# Patient Record
Sex: MALE | Race: WHITE | NOT HISPANIC OR LATINO | Employment: FULL TIME | URBAN - METROPOLITAN AREA
[De-identification: names, ages, dates, MRNs, and addresses within clinical notes are randomized per-mention and may not be internally consistent; named-entity substitution may affect disease eponyms.]

---

## 2018-02-12 ENCOUNTER — APPOINTMENT (EMERGENCY)
Dept: CT IMAGING | Facility: HOSPITAL | Age: 58
DRG: 811 | End: 2018-02-12
Payer: COMMERCIAL

## 2018-02-12 ENCOUNTER — HOSPITAL ENCOUNTER (INPATIENT)
Facility: HOSPITAL | Age: 58
LOS: 3 days | Discharge: HOME | DRG: 811 | End: 2018-02-15
Attending: EMERGENCY MEDICINE | Admitting: GENERAL PRACTICE
Payer: COMMERCIAL

## 2018-02-12 ENCOUNTER — APPOINTMENT (EMERGENCY)
Dept: RADIOLOGY | Facility: HOSPITAL | Age: 58
DRG: 811 | End: 2018-02-12
Payer: COMMERCIAL

## 2018-02-12 DIAGNOSIS — K62.5 RECTAL BLEEDING: ICD-10-CM

## 2018-02-12 DIAGNOSIS — R19.7 BLOODY DIARRHEA: ICD-10-CM

## 2018-02-12 DIAGNOSIS — I48.91 ATRIAL FIBRILLATION WITH RVR (HCC): ICD-10-CM

## 2018-02-12 DIAGNOSIS — I48.91 RAPID ATRIAL FIBRILLATION (HCC): ICD-10-CM

## 2018-02-12 DIAGNOSIS — D62 ACUTE BLOOD LOSS ANEMIA: Primary | ICD-10-CM

## 2018-02-12 DIAGNOSIS — K92.2 ACUTE LOWER GI BLEEDING: ICD-10-CM

## 2018-02-12 PROBLEM — N17.9 AKI (ACUTE KIDNEY INJURY) (HCC): Status: ACTIVE | Noted: 2018-02-12

## 2018-02-12 PROBLEM — I10 HYPERTENSION: Status: ACTIVE | Noted: 2018-02-12

## 2018-02-12 LAB
ABO GROUP BLD: NORMAL
ALBUMIN SERPL BCP-MCNC: 3.2 G/DL (ref 3.5–5)
ALP SERPL-CCNC: 62 U/L (ref 46–116)
ALT SERPL W P-5'-P-CCNC: 37 U/L (ref 12–78)
ANION GAP SERPL CALCULATED.3IONS-SCNC: 8 MMOL/L (ref 4–13)
APTT PPP: 31 SECONDS (ref 23–35)
AST SERPL W P-5'-P-CCNC: 15 U/L (ref 5–45)
ATRIAL RATE: 93 BPM
BASOPHILS # BLD AUTO: 0.03 THOUSANDS/ΜL (ref 0–0.1)
BASOPHILS NFR BLD AUTO: 0 % (ref 0–1)
BILIRUB SERPL-MCNC: 0.3 MG/DL (ref 0.2–1)
BILIRUB UR QL STRIP: NEGATIVE
BLD GP AB SCN SERPL QL: NEGATIVE
BUN SERPL-MCNC: 36 MG/DL (ref 5–25)
CALCIUM SERPL-MCNC: 8.4 MG/DL (ref 8.3–10.1)
CAMPYLOBACTER DNA SPEC NAA+PROBE: NORMAL
CHLORIDE SERPL-SCNC: 104 MMOL/L (ref 100–108)
CLARITY UR: CLEAR
CO2 SERPL-SCNC: 27 MMOL/L (ref 21–32)
COLOR UR: YELLOW
CREAT SERPL-MCNC: 1.55 MG/DL (ref 0.6–1.3)
EOSINOPHIL # BLD AUTO: 0.04 THOUSAND/ΜL (ref 0–0.61)
EOSINOPHIL NFR BLD AUTO: 0 % (ref 0–6)
ERYTHROCYTE [DISTWIDTH] IN BLOOD BY AUTOMATED COUNT: 13.2 % (ref 11.6–15.1)
GFR SERPL CREATININE-BSD FRML MDRD: 49 ML/MIN/1.73SQ M
GLUCOSE SERPL-MCNC: 125 MG/DL (ref 65–140)
GLUCOSE UR STRIP-MCNC: NEGATIVE MG/DL
HCT VFR BLD AUTO: 27.4 % (ref 36.5–49.3)
HCT VFR BLD AUTO: 27.8 % (ref 36.5–49.3)
HGB BLD-MCNC: 8.9 G/DL (ref 12–17)
HGB BLD-MCNC: 9 G/DL (ref 12–17)
HGB BLD-MCNC: 9.9 G/DL (ref 12–17)
HGB UR QL STRIP.AUTO: NEGATIVE
INR PPP: 2.8 (ref 0.86–1.16)
KETONES UR STRIP-MCNC: ABNORMAL MG/DL
LACTATE SERPL-SCNC: 2.2 MMOL/L (ref 0.5–2)
LACTATE SERPL-SCNC: 2.7 MMOL/L (ref 0.5–2)
LACTATE SERPL-SCNC: 2.8 MMOL/L (ref 0.5–2)
LEUKOCYTE ESTERASE UR QL STRIP: NEGATIVE
LYMPHOCYTES # BLD AUTO: 1.79 THOUSANDS/ΜL (ref 0.6–4.47)
LYMPHOCYTES NFR BLD AUTO: 11 % (ref 14–44)
MCH RBC QN AUTO: 30.4 PG (ref 26.8–34.3)
MCHC RBC AUTO-ENTMCNC: 32.4 G/DL (ref 31.4–37.4)
MCV RBC AUTO: 94 FL (ref 82–98)
MONOCYTES # BLD AUTO: 1.22 THOUSAND/ΜL (ref 0.17–1.22)
MONOCYTES NFR BLD AUTO: 7 % (ref 4–12)
NEUTROPHILS # BLD AUTO: 13.57 THOUSANDS/ΜL (ref 1.85–7.62)
NEUTS SEG NFR BLD AUTO: 81 % (ref 43–75)
NITRITE UR QL STRIP: NEGATIVE
NRBC BLD AUTO-RTO: 0 /100 WBCS
PH UR STRIP.AUTO: 5.5 [PH] (ref 4.5–8)
PLATELET # BLD AUTO: 175 THOUSANDS/UL (ref 149–390)
PMV BLD AUTO: 11.3 FL (ref 8.9–12.7)
POTASSIUM SERPL-SCNC: 4.4 MMOL/L (ref 3.5–5.3)
PROT SERPL-MCNC: 6 G/DL (ref 6.4–8.2)
PROT UR STRIP-MCNC: NEGATIVE MG/DL
PROTHROMBIN TIME: 30.3 SECONDS (ref 12.1–14.4)
QRS AXIS: 70 DEGREES
QRSD INTERVAL: 96 MS
QT INTERVAL: 298 MS
QTC INTERVAL: 441 MS
RBC # BLD AUTO: 2.96 MILLION/UL (ref 3.88–5.62)
RH BLD: POSITIVE
SALMONELLA DNA SPEC QL NAA+PROBE: NORMAL
SHIGA TOXIN STX GENE SPEC NAA+PROBE: NORMAL
SHIGELLA DNA SPEC QL NAA+PROBE: NORMAL
SODIUM SERPL-SCNC: 139 MMOL/L (ref 136–145)
SP GR UR STRIP.AUTO: 1.02 (ref 1–1.03)
SPECIMEN EXPIRATION DATE: NORMAL
T WAVE AXIS: -89 DEGREES
TROPONIN I SERPL-MCNC: <0.02 NG/ML
UROBILINOGEN UR QL STRIP.AUTO: 0.2 E.U./DL
VENTRICULAR RATE: 132 BPM
WBC # BLD AUTO: 16.81 THOUSAND/UL (ref 4.31–10.16)

## 2018-02-12 PROCEDURE — 83605 ASSAY OF LACTIC ACID: CPT | Performed by: EMERGENCY MEDICINE

## 2018-02-12 PROCEDURE — 36415 COLL VENOUS BLD VENIPUNCTURE: CPT

## 2018-02-12 PROCEDURE — 80053 COMPREHEN METABOLIC PANEL: CPT | Performed by: EMERGENCY MEDICINE

## 2018-02-12 PROCEDURE — 99285 EMERGENCY DEPT VISIT HI MDM: CPT

## 2018-02-12 PROCEDURE — 99254 IP/OBS CNSLTJ NEW/EST MOD 60: CPT | Performed by: INTERNAL MEDICINE

## 2018-02-12 PROCEDURE — 86900 BLOOD TYPING SEROLOGIC ABO: CPT | Performed by: EMERGENCY MEDICINE

## 2018-02-12 PROCEDURE — 93005 ELECTROCARDIOGRAM TRACING: CPT

## 2018-02-12 PROCEDURE — 86920 COMPATIBILITY TEST SPIN: CPT

## 2018-02-12 PROCEDURE — 85730 THROMBOPLASTIN TIME PARTIAL: CPT | Performed by: EMERGENCY MEDICINE

## 2018-02-12 PROCEDURE — 30233N1 TRANSFUSION OF NONAUTOLOGOUS RED BLOOD CELLS INTO PERIPHERAL VEIN, PERCUTANEOUS APPROACH: ICD-10-PCS | Performed by: INTERNAL MEDICINE

## 2018-02-12 PROCEDURE — 85025 COMPLETE CBC W/AUTO DIFF WBC: CPT | Performed by: EMERGENCY MEDICINE

## 2018-02-12 PROCEDURE — P9021 RED BLOOD CELLS UNIT: HCPCS

## 2018-02-12 PROCEDURE — 85014 HEMATOCRIT: CPT | Performed by: EMERGENCY MEDICINE

## 2018-02-12 PROCEDURE — 81003 URINALYSIS AUTO W/O SCOPE: CPT | Performed by: EMERGENCY MEDICINE

## 2018-02-12 PROCEDURE — 36415 COLL VENOUS BLD VENIPUNCTURE: CPT | Performed by: EMERGENCY MEDICINE

## 2018-02-12 PROCEDURE — 99223 1ST HOSP IP/OBS HIGH 75: CPT | Performed by: GENERAL PRACTICE

## 2018-02-12 PROCEDURE — 85018 HEMOGLOBIN: CPT | Performed by: EMERGENCY MEDICINE

## 2018-02-12 PROCEDURE — 86850 RBC ANTIBODY SCREEN: CPT | Performed by: EMERGENCY MEDICINE

## 2018-02-12 PROCEDURE — 83605 ASSAY OF LACTIC ACID: CPT | Performed by: PHYSICIAN ASSISTANT

## 2018-02-12 PROCEDURE — 85610 PROTHROMBIN TIME: CPT | Performed by: EMERGENCY MEDICINE

## 2018-02-12 PROCEDURE — 85018 HEMOGLOBIN: CPT | Performed by: GENERAL PRACTICE

## 2018-02-12 PROCEDURE — 74177 CT ABD & PELVIS W/CONTRAST: CPT

## 2018-02-12 PROCEDURE — 84484 ASSAY OF TROPONIN QUANT: CPT | Performed by: EMERGENCY MEDICINE

## 2018-02-12 PROCEDURE — 36430 TRANSFUSION BLD/BLD COMPNT: CPT

## 2018-02-12 PROCEDURE — 71046 X-RAY EXAM CHEST 2 VIEWS: CPT

## 2018-02-12 PROCEDURE — 87505 NFCT AGENT DETECTION GI: CPT | Performed by: EMERGENCY MEDICINE

## 2018-02-12 PROCEDURE — 93010 ELECTROCARDIOGRAM REPORT: CPT | Performed by: INTERNAL MEDICINE

## 2018-02-12 PROCEDURE — 86901 BLOOD TYPING SEROLOGIC RH(D): CPT | Performed by: EMERGENCY MEDICINE

## 2018-02-12 PROCEDURE — C9132 KCENTRA, PER I.U.: HCPCS | Performed by: EMERGENCY MEDICINE

## 2018-02-12 RX ORDER — PANTOPRAZOLE SODIUM 40 MG/1
40 INJECTION, POWDER, FOR SOLUTION INTRAVENOUS EVERY 12 HOURS
Status: DISCONTINUED | OUTPATIENT
Start: 2018-02-12 | End: 2018-02-13

## 2018-02-12 RX ORDER — DIGOXIN 0.25 MG/ML
250 INJECTION INTRAMUSCULAR; INTRAVENOUS ONCE
Status: COMPLETED | OUTPATIENT
Start: 2018-02-12 | End: 2018-02-12

## 2018-02-12 RX ORDER — DILTIAZEM HYDROCHLORIDE 120 MG/1
120 CAPSULE, EXTENDED RELEASE ORAL DAILY
COMMUNITY

## 2018-02-12 RX ORDER — ONDANSETRON 2 MG/ML
INJECTION INTRAMUSCULAR; INTRAVENOUS
Status: COMPLETED
Start: 2018-02-12 | End: 2018-02-12

## 2018-02-12 RX ORDER — SODIUM CHLORIDE 9 MG/ML
75 INJECTION, SOLUTION INTRAVENOUS CONTINUOUS
Status: DISCONTINUED | OUTPATIENT
Start: 2018-02-12 | End: 2018-02-13

## 2018-02-12 RX ORDER — DIGOXIN 125 MCG
125 TABLET ORAL DAILY
Status: DISCONTINUED | OUTPATIENT
Start: 2018-02-13 | End: 2018-02-13

## 2018-02-12 RX ORDER — DIGOXIN 0.25 MG/ML
250 INJECTION INTRAMUSCULAR; INTRAVENOUS ONCE
Status: DISCONTINUED | OUTPATIENT
Start: 2018-02-13 | End: 2018-02-13

## 2018-02-12 RX ORDER — ACETAMINOPHEN 325 MG/1
650 TABLET ORAL EVERY 6 HOURS PRN
Status: DISCONTINUED | OUTPATIENT
Start: 2018-02-12 | End: 2018-02-15 | Stop reason: HOSPADM

## 2018-02-12 RX ORDER — HYDROCODONE BITARTRATE AND ACETAMINOPHEN 5; 325 MG/1; MG/1
1 TABLET ORAL EVERY 6 HOURS PRN
Status: DISCONTINUED | OUTPATIENT
Start: 2018-02-12 | End: 2018-02-15 | Stop reason: HOSPADM

## 2018-02-12 RX ORDER — LOSARTAN POTASSIUM 25 MG/1
25 TABLET ORAL DAILY
COMMUNITY
End: 2018-02-15 | Stop reason: HOSPADM

## 2018-02-12 RX ORDER — HYDROCODONE BITARTRATE AND ACETAMINOPHEN 10; 325 MG/1; MG/1
1 TABLET ORAL EVERY 6 HOURS PRN
COMMUNITY

## 2018-02-12 RX ORDER — ONDANSETRON 2 MG/ML
INJECTION INTRAMUSCULAR; INTRAVENOUS
Status: DISPENSED
Start: 2018-02-12 | End: 2018-02-13

## 2018-02-12 RX ORDER — ONDANSETRON 2 MG/ML
4 INJECTION INTRAMUSCULAR; INTRAVENOUS ONCE
Status: COMPLETED | OUTPATIENT
Start: 2018-02-12 | End: 2018-02-12

## 2018-02-12 RX ORDER — ALBUTEROL SULFATE 90 UG/1
2 AEROSOL, METERED RESPIRATORY (INHALATION) EVERY 6 HOURS PRN
COMMUNITY

## 2018-02-12 RX ORDER — MONTELUKAST SODIUM 10 MG/1
10 TABLET ORAL
Status: DISCONTINUED | OUTPATIENT
Start: 2018-02-12 | End: 2018-02-15 | Stop reason: HOSPADM

## 2018-02-12 RX ORDER — DILTIAZEM HYDROCHLORIDE 120 MG/1
120 CAPSULE, COATED, EXTENDED RELEASE ORAL DAILY
Status: DISCONTINUED | OUTPATIENT
Start: 2018-02-13 | End: 2018-02-13

## 2018-02-12 RX ORDER — DICLOFENAC SODIUM 75 MG/1
50 TABLET, DELAYED RELEASE ORAL 2 TIMES DAILY
COMMUNITY
End: 2018-02-15 | Stop reason: HOSPADM

## 2018-02-12 RX ORDER — PANTOPRAZOLE SODIUM 40 MG/1
40 TABLET, DELAYED RELEASE ORAL DAILY
COMMUNITY

## 2018-02-12 RX ORDER — OSELTAMIVIR PHOSPHATE 75 MG/1
75 CAPSULE ORAL EVERY 12 HOURS SCHEDULED
COMMUNITY
End: 2018-02-15 | Stop reason: HOSPADM

## 2018-02-12 RX ORDER — PANTOPRAZOLE SODIUM 40 MG/1
40 TABLET, DELAYED RELEASE ORAL DAILY
Status: DISCONTINUED | OUTPATIENT
Start: 2018-02-13 | End: 2018-02-12

## 2018-02-12 RX ORDER — HYDRALAZINE HYDROCHLORIDE 20 MG/ML
5 INJECTION INTRAMUSCULAR; INTRAVENOUS EVERY 6 HOURS PRN
Status: DISCONTINUED | OUTPATIENT
Start: 2018-02-12 | End: 2018-02-13

## 2018-02-12 RX ORDER — FLUTICASONE PROPIONATE 220 UG/1
1 AEROSOL, METERED RESPIRATORY (INHALATION) 2 TIMES DAILY
COMMUNITY

## 2018-02-12 RX ORDER — ALBUTEROL SULFATE 90 UG/1
2 AEROSOL, METERED RESPIRATORY (INHALATION) EVERY 6 HOURS PRN
Status: DISCONTINUED | OUTPATIENT
Start: 2018-02-12 | End: 2018-02-15 | Stop reason: HOSPADM

## 2018-02-12 RX ORDER — FLUTICASONE PROPIONATE 220 UG/1
1 AEROSOL, METERED RESPIRATORY (INHALATION) 2 TIMES DAILY
Status: DISCONTINUED | OUTPATIENT
Start: 2018-02-12 | End: 2018-02-15 | Stop reason: HOSPADM

## 2018-02-12 RX ORDER — MOMETASONE FUROATE 50 UG/1
2 SPRAY, METERED NASAL DAILY
COMMUNITY

## 2018-02-12 RX ORDER — MONTELUKAST SODIUM 10 MG/1
10 TABLET ORAL
COMMUNITY

## 2018-02-12 RX ORDER — ONDANSETRON 2 MG/ML
4 INJECTION INTRAMUSCULAR; INTRAVENOUS EVERY 6 HOURS PRN
Status: DISCONTINUED | OUTPATIENT
Start: 2018-02-12 | End: 2018-02-13

## 2018-02-12 RX ADMIN — DIGOXIN 250 MCG: 0.25 INJECTION INTRAMUSCULAR; INTRAVENOUS at 23:37

## 2018-02-12 RX ADMIN — IOHEXOL 100 ML: 350 INJECTION, SOLUTION INTRAVENOUS at 15:50

## 2018-02-12 RX ADMIN — SODIUM CHLORIDE 1000 ML: 0.9 INJECTION, SOLUTION INTRAVENOUS at 14:10

## 2018-02-12 RX ADMIN — SODIUM CHLORIDE 75 ML/HR: 0.9 INJECTION, SOLUTION INTRAVENOUS at 22:52

## 2018-02-12 RX ADMIN — ONDANSETRON 4 MG: 2 INJECTION INTRAMUSCULAR; INTRAVENOUS at 23:06

## 2018-02-12 RX ADMIN — ONDANSETRON 4 MG: 2 INJECTION INTRAMUSCULAR; INTRAVENOUS at 14:11

## 2018-02-12 RX ADMIN — POLYETHYLENE GLYCOL 3350, SODIUM SULFATE ANHYDROUS, SODIUM BICARBONATE, SODIUM CHLORIDE, POTASSIUM CHLORIDE 4000 ML: 236; 22.74; 6.74; 5.86; 2.97 POWDER, FOR SOLUTION ORAL at 21:31

## 2018-02-12 RX ADMIN — BISACODYL 10 MG: 5 TABLET, COATED ORAL at 21:31

## 2018-02-12 RX ADMIN — SODIUM CHLORIDE, SODIUM LACTATE, POTASSIUM CHLORIDE, AND CALCIUM CHLORIDE 1000 ML: .6; .31; .03; .02 INJECTION, SOLUTION INTRAVENOUS at 20:15

## 2018-02-12 RX ADMIN — PROTHROMBIN, COAGULATION FACTOR VII HUMAN, COAGULATION FACTOR IX HUMAN, COAGULATION FACTOR X HUMAN, PROTEIN C, PROTEIN S HUMAN, AND WATER 2700 UNITS: KIT at 14:47

## 2018-02-12 RX ADMIN — MONTELUKAST SODIUM 10 MG: 10 TABLET, FILM COATED ORAL at 23:37

## 2018-02-12 NOTE — LETTER
[address] 2ND FLOOR MED SURG UNIT  [facility] [address]  Dept: [phone]    February 19, 2018     Patient: Robel Liu   YOB: 1960   Date of Visit: 2/12/2018       To Whom it May Concern:    Robel Liu is under my professional care  He was seen in the hospital from 2/12/2018   to 02/19/18  Off work until 02/19/18  If you have any questions or concerns, please don't hesitate to call           Sincerely,          Deni Ortega MD

## 2018-02-12 NOTE — ED PROVIDER NOTES
History  Chief Complaint   Patient presents with    Black or Bloody Stool     Pt c/o of single episode of gross michael blood in stool today  Patient is a 78-year-old male with past medical history of atrial fibrillation on Xarelto, hypertension, GERD, chronic back pain, presents to the emergency department complaining of rectal bleeding  Patient states starting this morning he noticed he was having crampy pain in his lower abdomen  He was at a work meeting when the crampy pain started any had to excuse himself to use the bathroom  He states he had an episode of diarrhea with bright red blood mixed in to the stool  He had several more episodes of bloody diarrhea with the last one being at around 11:00 a m  He denies ever having blood in his stool like this before  He does admit to feeling generally more weak and slightly lightheaded especially with standing since this morning  His family members recently were diagnosed with the flu and he was coming down with a sore throat so he started Tamiflu several days ago  Denies any other new medications  He is on Xarelto for history of AFib  He denies anyone else in the household having diarrhea or bloody stool  Denies any unusual food intake or recent travel outside the country  Patient is currently visiting from [place] on a business trip  Review systems otherwise negative  He denies headache, dizziness, syncope, fever or chills, URI symptoms, cough, shortness of breath, palpitations, chest pain, abdominal pain, nausea, vomiting, hematemesis, melena, dysuria, frequency, hematuria, skin rash or color change, lateralizing extremity weakness or paresthesia or other focal neurologic deficits  He denies ever having a colonoscopy  No significant family history of colon problems or colon cancer  Denies prior abdominal surgical history          History provided by:  Patient   used: No    Black or Bloody Stool   Associated symptoms: light-headedness    Associated symptoms: no abdominal pain, no dizziness, no fever and no vomiting        Prior to Admission Medications   Prescriptions Last Dose Informant Patient Reported? Taking? HYDROcodone-acetaminophen (NORCO)  mg per tablet   Yes Yes   Sig: Take 1 tablet by mouth every 6 (six) hours as needed for moderate pain   albuterol (PROVENTIL HFA,VENTOLIN HFA) 90 mcg/act inhaler   Yes Yes   Sig: Inhale 2 puffs every 6 (six) hours as needed for wheezing   diclofenac (VOLTAREN) 75 mg EC tablet   Yes Yes   Sig: Take 50 mg by mouth 2 (two) times a day   diltiazem (DILACOR XR) 120 MG 24 hr capsule   Yes Yes   Sig: Take 120 mg by mouth daily   fluticasone (FLOVENT HFA) 220 mcg/act inhaler   Yes Yes   Sig: Inhale 1 puff 2 (two) times a day   losartan (COZAAR) 25 mg tablet   Yes Yes   Sig: Take 25 mg by mouth daily   mometasone (NASONEX) 50 mcg/act nasal spray   Yes Yes   Si sprays into each nostril daily   montelukast (SINGULAIR) 10 mg tablet   Yes Yes   Sig: Take 10 mg by mouth daily at bedtime   oseltamivir (TAMIFLU) 75 mg capsule   Yes Yes   Sig: Take 75 mg by mouth every 12 (twelve) hours   pantoprazole (PROTONIX) 40 mg tablet   Yes Yes   Sig: Take 40 mg by mouth daily   rivaroxaban (XARELTO) 20 mg tablet   Yes Yes   Sig: Take 20 mg by mouth      Facility-Administered Medications: None       Past Medical History:   Diagnosis Date    [facility]     Asthma     Hypertension        Past Surgical History:   Procedure Laterality Date    JOINT REPLACEMENT      ROTATOR CUFF REPAIR         Family History   Problem Relation Age of Onset    Heart disease Father     Heart disease Brother     Asthma Brother      I have reviewed and agree with the history as documented  Social History   Substance Use Topics    Smoking status: Never Smoker    Smokeless tobacco: Never Used    Alcohol use No        Review of Systems   Constitutional: Positive for fatigue   Negative for chills, diaphoresis and fever    HENT: Negative for congestion, ear pain, rhinorrhea and sore throat  Eyes: Negative for photophobia, pain and visual disturbance  Respiratory: Negative for cough, chest tightness, shortness of breath and wheezing  Cardiovascular: Negative for chest pain and palpitations  Gastrointestinal: Positive for blood in stool  Negative for abdominal distention, abdominal pain, constipation, diarrhea, nausea and vomiting  Genitourinary: Negative for dysuria, flank pain, frequency and hematuria  Musculoskeletal: Negative for back pain, neck pain and neck stiffness  Skin: Negative for color change, pallor, rash and wound  Allergic/Immunologic: Negative for immunocompromised state  Neurological: Positive for weakness and light-headedness  Negative for dizziness, syncope, facial asymmetry, speech difficulty, numbness and headaches  Hematological: Bruises/bleeds easily  Psychiatric/Behavioral: Negative for confusion, decreased concentration and sleep disturbance  All other systems reviewed and are negative  Physical Exam  ED Triage Vitals [02/12/18 1209]   Temperature Pulse Respirations Blood Pressure SpO2   98 °F (36 7 °C) 90 18 (!) 101/40 98 %      Temp Source Heart Rate Source Patient Position - Orthostatic VS BP Location FiO2 (%)   Oral Monitor Lying Right arm --      Pain Score       No Pain         Vitals:    02/13/18 0744 02/13/18 0840 02/13/18 0900 02/13/18 0907   BP: 117/80 142/89  142/89   BP Location: Right arm      Pulse: (!) 150 (!) 116 (!) 120 (!) 110   Resp: 18 18 18   Temp: 98 5 °F (36 9 °C) 98 8 °F (37 1 °C)     TempSrc: Oral      SpO2: 96%      Weight:       Height:         Physical Exam   Constitutional: He is oriented to person, place, and time  He appears well-developed and well-nourished  No distress  HENT:   Head: Normocephalic and atraumatic  Mouth/Throat: Oropharynx is clear and moist  No oropharyngeal exudate     Eyes: EOM are normal  Pupils are equal, round, and reactive to light  Pale conjunctiva bilaterally  Neck: Normal range of motion  Neck supple  No JVD present  Cardiovascular: Normal heart sounds and intact distal pulses  Exam reveals no gallop and no friction rub  No murmur heard  Patient tachycardic and rhythm irregularly irregular  Pulmonary/Chest: Effort normal and breath sounds normal  No respiratory distress  He has no wheezes  He has no rales  He exhibits no tenderness  Abdominal: Soft  Bowel sounds are normal  He exhibits no distension  There is no tenderness  There is no rebound and no guarding  A hernia is present  Reducible, nontender umbilical hernia  Genitourinary: Rectal exam shows guaiac positive stool  Musculoskeletal: Normal range of motion  He exhibits no edema or tenderness  Lymphadenopathy:     He has no cervical adenopathy  Neurological: He is alert and oriented to person, place, and time  No cranial nerve deficit  No gross motor or sensory deficits  Skin: Skin is warm and dry  Capillary refill takes less than 2 seconds  No rash noted  He is not diaphoretic  There is pallor  Psychiatric: He has a normal mood and affect  His behavior is normal    Nursing note and vitals reviewed        ED Medications  Medications   albuterol (PROVENTIL HFA,VENTOLIN HFA) inhaler 2 puff ( Inhalation MAR Unhold 2/13/18 1010)   fluticasone (FLOVENT HFA) 220 mcg/act inhaler 1 puff ( Inhalation MAR Unhold 2/13/18 1010)   HYDROcodone-acetaminophen (NORCO) 5-325 mg per tablet 1 tablet ( Oral MAR Unhold 2/13/18 1010)   montelukast (SINGULAIR) tablet 10 mg ( Oral MAR Unhold 2/13/18 1010)   acetaminophen (TYLENOL) tablet 650 mg ( Oral MAR Unhold 2/13/18 1010)   sodium chloride 0 9 % infusion (150 mL/hr Intravenous Rate/Dose Change 2/13/18 0837)   prochlorperazine (COMPAZINE) tablet 5 mg ( Oral MAR Unhold 2/13/18 1010)   sodium chloride 0 9 % bolus 500 mL ( Intravenous MAR Unhold 2/13/18 1010)   metoprolol (LOPRESSOR) injection 5 mg ( Intravenous MAR Unhold 2/13/18 1010)   pantoprazole (PROTONIX) 80 mg in sodium chloride 0 9 % 100 mL infusion (8 mg/hr Intravenous New Bag 2/13/18 0911)   metoprolol tartrate (LOPRESSOR) tablet 25 mg ( Oral MAR Unhold 2/13/18 1010)   sodium chloride 0 9 % bolus 1,000 mL (0 mL Intravenous Stopped 2/12/18 1536)   ondansetron (ZOFRAN) injection 4 mg (4 mg Intravenous Given 2/12/18 1411)   prothrombin complex conc human (KCENTRA) IV 2,700 Units (2,700 Units Intravenous Given 2/12/18 1447)   iohexol (OMNIPAQUE) 350 MG/ML injection (MULTI-DOSE) 100 mL (100 mL Intravenous Given 2/12/18 1550)   polyethylene glycol (GOLYTELY) bowel prep 4,000 mL (4,000 mL Oral Given 2/12/18 2131)   bisacodyl (DULCOLAX) EC tablet 10 mg (10 mg Oral Given 2/12/18 2131)   lactated ringers bolus 1,000 mL (1,000 mL Intravenous New Bag 2/12/18 2015)   digoxin (LANOXIN) injection 250 mcg (250 mcg Intravenous Given 2/12/18 2337)   sodium chloride 0 9 % bolus 500 mL (500 mL Intravenous New Bag 2/13/18 0749)   digoxin (LANOXIN) injection 250 mcg (250 mcg Intravenous Given 2/13/18 0807)       Diagnostic Studies  Results Reviewed     Procedure Component Value Units Date/Time    Protime-INR [[id number]]  (Abnormal) Collected:  02/13/18 0705    Lab Status:  Final result Specimen:  Blood Updated:  02/13/18 0730     Protime 18 9 (H) seconds      INR 1 54 (H)    Stool Enteric Bacterial Panel by PCR [[id number]]  (Normal) Collected:  02/12/18 1354    Lab Status:  Final result Specimen:  Stool from Rectum Updated:  02/12/18 2351     Salmonella sp PCR None Detected     Shigella sp/Enteroinvasive E  coli (EIEC) PCR None Detected     Campylobacter sp (jejuni and coli) PCR None Detected     Shiga toxin 1/Shiga tonix 2 genes PCR None Detected    UA w Reflex to Microscopic [[id number]]  (Abnormal) Collected:  02/12/18 2200    Lab Status:  Final result Specimen:  Urine from Urine, Clean Catch Updated:  02/12/18 5072     Color, UA Yellow     Clarity, UA Clear     Specific [place], UA 1 020     pH, UA 5 5     Leukocytes, UA Negative     Nitrite, UA Negative     Protein, UA Negative mg/dl      Glucose, UA Negative mg/dl      Ketones, UA Trace (A) mg/dl      Urobilinogen, UA 0 2 E U /dl      Bilirubin, UA Negative     Blood, UA Negative    Lactic acid, plasma [[id number]]  (Abnormal) Collected:  02/12/18 1923    Lab Status:  Final result Specimen:  Blood from Arm, Right Updated:  02/12/18 2002     LACTIC ACID 2 2 (HH) mmol/L     Narrative:         Result may be elevated if tourniquet was used during collection  Hemoglobin and hematocrit, blood [[id number]]  (Abnormal) Collected:  02/12/18 1923    Lab Status:  Final result Specimen:  Blood from Arm, Right Updated:  02/12/18 1935     Hemoglobin 8 9 (L) g/dL      Hematocrit 27 4 (L) %     [place] draw [[id number]] Collected:  02/12/18 1238    Lab Status:  Final result Specimen:  Blood Updated:  02/12/18 1509    Narrative: The following orders were created for panel order [place] draw  Procedure                               Abnormality         Status                     ---------                               -----------         ------                     Cynthia Green Top 7ml on PHAL[[id number]]                          Final result                 Please view results for these tests on the individual orders  Lactic acid, plasma [[id number]]  (Abnormal) Collected:  02/12/18 1359    Lab Status:  Final result Specimen:  Blood from Arm, Right Updated:  02/12/18 1448     LACTIC ACID 2 7 (HH) mmol/L     Narrative:         Result may be elevated if tourniquet was used during collection  APTT [[id number]]  (Normal) Collected:  02/12/18 1233    Lab Status:  Final result Specimen:  Blood from Arm, Right Updated:  02/12/18 1350     PTT 31 seconds     Narrative:          Therapeutic Heparin Range = 60-90 seconds    Protime-INR [[id number]]  (Abnormal) Collected:  02/12/18 1233    Lab Status:  Final result Specimen:  Blood from Arm, Right Updated:  02/12/18 1312 Protime 30 3 (H) seconds      INR 2 80 (H)    Comprehensive metabolic panel [[id number]]  (Abnormal) Collected:  02/12/18 1233    Lab Status:  Final result Specimen:  Blood from Arm, Right Updated:  02/12/18 1309     Sodium 139 mmol/L      Potassium 4 4 mmol/L      Chloride 104 mmol/L      CO2 27 mmol/L      Anion Gap 8 mmol/L      BUN 36 (H) mg/dL      Creatinine 1 55 (H) mg/dL      Glucose 125 mg/dL      Calcium 8 4 mg/dL      AST 15 U/L      ALT 37 U/L      Alkaline Phosphatase 62 U/L      Total Protein 6 0 (L) g/dL      Albumin 3 2 (L) g/dL      Total Bilirubin 0 30 mg/dL      eGFR 49 ml/min/1 73sq m     Narrative:         National Kidney Disease Education Program recommendations are as follows:  GFR calculation is accurate only with a steady state creatinine  Chronic Kidney disease less than 60 ml/min/1 73 sq  meters  Kidney failure less than 15 ml/min/1 73 sq  meters  Troponin I [[id number]]  (Normal) Collected:  02/12/18 1233    Lab Status:  Final result Specimen:  Blood from Arm, Right Updated:  02/12/18 1308     Troponin I <0 02 ng/mL     Narrative:         [employer] Chemistry analyzer 99% cutoff is > 0 04 ng/mL in network labs    o cTnI 99% cutoff is useful only when applied to patients in the clinical setting of myocardial ischemia  o cTnI 99% cutoff should be interpreted in the context of clinical history, ECG findings and possibly cardiac imaging to establish correct diagnosis  o cTnI 99% cutoff may be suggestive but clearly not indicative of a coronary event without the clinical setting of myocardial ischemia      CBC and differential [[id number]]  (Abnormal) Collected:  02/12/18 1233    Lab Status:  Final result Specimen:  Blood from Arm, Right Updated:  02/12/18 1245     WBC 16 81 (H) Thousand/uL      RBC 2 96 (L) Million/uL      Hemoglobin 9 0 (L) g/dL      Hematocrit 27 8 (L) %      MCV 94 fL      MCH 30 4 pg      MCHC 32 4 g/dL      RDW 13 2 %      MPV 11 3 fL      Platelets 095 Thousands/uL      nRBC 0 /100 WBCs      Neutrophils Relative 81 (H) %      Lymphocytes Relative 11 (L) %      Monocytes Relative 7 %      Eosinophils Relative 0 %      Basophils Relative 0 %      Neutrophils Absolute 13 57 (H) Thousands/µL      Lymphocytes Absolute 1 79 Thousands/µL      Monocytes Absolute 1 22 Thousand/µL      Eosinophils Absolute 0 04 Thousand/µL      Basophils Absolute 0 03 Thousands/µL                  X-ray chest 2 views   ED Interpretation by Matthew Ferrell DO (02/12 1614)   No acute abnormality in the chest       Final Result by Ely Nolasco MD (02/12 1620)      No active pulmonary disease  Workstation performed: [id number]         CT abdomen pelvis with contrast   Final Result by Caryle Fine, MD (02/12 1612)      1  Colonic diverticulosis without acute diverticulitis  Scattered air-fluid levels in the colon, nonspecific but may indicate underlying diarrhea  Correlate clinically  2   1 4 cm indeterminant heterogeneous left adrenal nodule with scattered calcifications  Although its imaging features are indeterminate, it does demonstrate some suspicious features such as heterogeneity and calcifications, therefore recommend adrenal    mass protocol CT or MRI for further characterization  Recommendation based on institutional consensus and [address] [place],[address] of Radiology 2010;7:[phone]   3  Small fat-containing left inguinal hernia  Moderate-sized fat-containing periumbilical hernia  4   Bilateral spondylolysis defects at L5 with grade 1-2 anterolisthesis of L5 on S1  Mild to moderate posterior wedging of L5 may be degenerative        Workstation performed: [id number]         MRI inpatient order    (Results Pending)              Procedures  ECG 12 Lead Documentation  Date/Time: 2/12/2018 2:29 PM  Performed by: Anna Almaguer by: Obi Gilbert     ECG reviewed by me, the ED Provider: yes    Patient location:  ED  Previous ECG:     Previous ECG:  Unavailable  Rate: ECG rate:  132    ECG rate assessment: tachycardic    Rhythm:     Rhythm: atrial fibrillation      Rhythm comment:  With RVR  Ectopy:     Ectopy: PVCs    QRS:     QRS axis:  Normal    QRS intervals:  Normal  Conduction:     Conduction: normal    ST segments:     ST segments:  Non-specific  T waves:     T waves: non-specific      CriticalCare Time  Performed by: Tina Dillon  Authorized by: Tina Dillon     Critical care provider statement:     Critical care time (minutes):  80    Critical care was necessary to treat or prevent imminent or life-threatening deterioration of the following conditions:  Shock and circulatory failure (Acute blood loss anemia from lower GI bleeding requiring blood tranfusion  Rapid A-fib secondary to acute blood loss anemia requiring aggressing IVF/blood product resuscitation)    Critical care was time spent personally by me on the following activities:  Blood draw for specimens, obtaining history from patient or surrogate, development of treatment plan with patient or surrogate, discussions with consultants, examination of patient, evaluation of patient's response to treatment, re-evaluation of patient's condition, ordering and review of radiographic studies, ordering and review of laboratory studies, ordering and performing treatments and interventions and interpretation of cardiac output measurements    I assumed direction of critical care for this patient from another provider in my specialty: no             Phone Contacts  ED Phone Contact    ED Course  ED Course as of Feb 13 1048   Mon Feb 12, 2018   1338 Patient confirms no known history of anemia so likely this is an acute drop in hemoglobin  Hemoglobin: (!) 9 0   1357 Patient reassessed and now retching and nauseated  Will give 4 mg of IV Zofran  Patient had another bowel movement that I was able to visualize and it was purely bloody with passage of clots    At this point, I feel patient has ongoing bleeding and is at risk for severe anemia and hemodynamic compromise  Will recommend starting blood transfusion as well as reversal of Xarelto with 59 Stephany Champion  GI paged for stat consult  1171 GI was paged a total of 3 times  Awaiting callback  1 Spoke with RADHA Almodovar who will come down within half hour to evaluate patient  She agreed with current plan  1506 LACTIC ACID: (!!) 2 7   1507 GI AP at bedside  1520 Patient to likely go for scope in AM per GI     1613  Notified by nurse that there was a delay in receiving the blood as the paper order did not match the blood that was initially sent  1719 Patient now getting 1st unit of blood  Will reassess HR while patient getting transfusion  7003   Patient reassessed  He is comfortable  He did have another bowel movement since his initial one in the ED and he states it was a lot less bloody and thinner  He denies chest pain, dyspnea or palpitations at this time  Heart rate anywhere from 120 to 140s  Blood pressure 463 systolic  Will consult critical care  [phone] with critical care attending Dr Frances Clifton, who will send a PA down to evaluate patient  He recommended additional IVF bolus with LR     1853 Critical care advanced practitioner evaluated patient at bedside  She feels he is appropriate to be admitted to the Internal Medicine service  She recommends continued IV fluid resuscitation as well as blood products and to hold off on treating the rapid AFib with any cardiac medication until he has been adequately fluid resuscitated  [address] paged  190 Patient had another episode of bloody diarrhea   which nurse states consisted of 400 mL of michael blood  Will likely give him an additional (2nd) unit of PRBCs after 1st unit                                  MDM  Number of Diagnoses or Management Options  Diagnosis management comments: 48-year-old male with history of atrial fibrillation on Xarelto, presents with several episodes of bloody diarrhea, currently tachycardic and in rapid AFib  He also appears slightly pale and has no known history of anemia with a hemoglobin today of 9  At this point, I am concerned about acute blood loss anemia causing tachycardia and symptoms  As far as the etiology of the bloody diarrhea, this could be infectious, ischemic or possibly secondary to diverticulosis, colon tumor, polyps or AVM  Will check cardiac and abdominal labs, coags, type and screen, EKG, chest x-ray, CT scan of the abdomen and pelvis  Will start IV fluid resuscitation with 1 L normal saline bolus  If heart rate shows no improvement after normal saline bolus, will consider blood transfusion or cardiac medication such as lopressor or cardizem  Will recommend admission for further GI workup  Amount and/or Complexity of Data Reviewed  Clinical lab tests: ordered and reviewed  Tests in the radiology section of CPT®: reviewed and ordered  Tests in the medicine section of CPT®: ordered and reviewed  Independent visualization of images, tracings, or specimens: yes      CritCare Time    Disposition  Final diagnoses:   [facility]   Acute lower GI bleeding   Bloody diarrhea     Time reflects when diagnosis was documented in both MDM as applicable and the Disposition within this note     Time User Action Codes Description Comment    2/12/2018  3:54 PM Franklin Banegas Add [D62] Acute blood loss anemia     2/12/2018  3:54 PM Franklin Banegas Add [K62 5] Rectal bleeding     2/12/2018  6:18 PM Govind CABA Add [I48 91] Rapid atrial fibrillation ([facility] )     2/12/2018  7:23 PM Dannial Ac E Add [K92 2] Acute lower GI bleeding     2/12/2018  7:23 PM Danashwinl Ac E Add [R19 7] Bloody diarrhea       ED Disposition     ED Disposition Condition Comment    Admit  Case was discussed with YEHUDA and the patient's admission status was agreed to be Admission Status: inpatient status to the service of Dr Shani Strauss           Follow-up Information    None Current Discharge Medication List      CONTINUE these medications which have NOT CHANGED    Details   albuterol (PROVENTIL HFA,VENTOLIN HFA) 90 mcg/act inhaler Inhale 2 puffs every 6 (six) hours as needed for wheezing      diclofenac (VOLTAREN) 75 mg EC tablet Take 50 mg by mouth 2 (two) times a day      diltiazem (DILACOR XR) 120 MG 24 hr capsule Take 120 mg by mouth daily      fluticasone (FLOVENT HFA) 220 mcg/act inhaler Inhale 1 puff 2 (two) times a day      HYDROcodone-acetaminophen (NORCO)  mg per tablet Take 1 tablet by mouth every 6 (six) hours as needed for moderate pain      losartan (COZAAR) 25 mg tablet Take 25 mg by mouth daily      mometasone (NASONEX) 50 mcg/act nasal spray 2 sprays into each nostril daily      montelukast (SINGULAIR) 10 mg tablet Take 10 mg by mouth daily at bedtime      oseltamivir (TAMIFLU) 75 mg capsule Take 75 mg by mouth every 12 (twelve) hours      pantoprazole (PROTONIX) 40 mg tablet Take 40 mg by mouth daily      rivaroxaban (XARELTO) 20 mg tablet Take 20 mg by mouth           No discharge procedures on file      ED Provider  Electronically Signed by           Dhaval Hansen DO  02/13/18 4020

## 2018-02-12 NOTE — CONSULTS
Consultation - 126 [facility]  Mary Kay Hooper 61 y o  male MRN: [id number]  Unit/Bed#: ED 20 Encounter: [id number]        Consults    Reason for Consult / Principal Problem: BRBPR, Acute blood loss anemia    HPI: Mr Perla Kwok is a 60 yo M with a PMH of Afib on Xarelto, GERD, HTN, chronic back pain and shoulder pain, presenting with acute onset of BRBPR last night  Over the past 1-2 weeks, he has been feeling weak  His whole family has been sick with flu like symptoms so he saw his PCP in [place] before leaving for his work trip to [place] [facility]  He was started on Tamiflu which made him feel nauseous  Otherwise he was generally feeling well and then had his first episode of rectal bleeding last night unexpectedly  He denies any associated abdominal pain but he was nauseous and had dry heaves  He denies hematemesis  He has never had bleeding like this before  He has never had a colonoscopy or EGD  He denies any recent unintentional weight loss  He denies any family history of colon cancer or IBD  He denies any major abdominal surgeries  His last dose of xarelto was this morning  His last bloody bowel movement was about 2:30PM  He is lightheaded and dizzy, but he does admit to a "weak stomach "    REVIEW OF SYSTEMS: Negative except for as stated above    Historical Information   Past Medical History:   Diagnosis Date    A-fib ([address] )     Hypertension      Past Surgical History:   Procedure Laterality Date    JOINT REPLACEMENT       Social History   History   Alcohol Use No     History   Drug Use No     History   Smoking Status    Never Smoker   Smokeless Tobacco    Never Used     History reviewed  No pertinent family history  Meds/Allergies       (Not in a hospital admission)  No current facility-administered medications for this encounter          Allergies   Allergen Reactions    Penicillins Hives           Objective     Blood pressure 102/61, pulse (!) 125, temperature 97 7 °F (36 5 °C), temperature source Oral, resp  rate 18, height 5' 6" (1 676 m), weight 109 kg (240 lb 6 4 oz), SpO2 97 %  No intake or output data in the 24 hours ending 02/12/18 1531      PHYSICAL EXAM:      General Appearance:   Alert, oriented x3, no acute distress   HENT[de-identified]   Eyes:  Normocephalic, atraumatic   Anicteric   Neck:  Supple, symmetrical, trachea midline    Lungs:   Clear to auscultation bilaterally, no respiratory distress   Heart[de-identified]   (+) Irregularly irregular, Afib with RVR   Abdomen:   Non-distended, soft, BS active, NTTP   Rectal:   Deferred    Extremities:  No cyanosis or edema    Skin:  No jaundice or pallor     Lab Results:     Results from last 7 days  Lab Units 02/12/18  1233   WBC Thousand/uL 16 81*   HEMOGLOBIN g/dL 9 0*   HEMATOCRIT % 27 8*   PLATELETS Thousands/uL 175   NEUTROS PCT % 81*   LYMPHS PCT % 11*   MONOS PCT % 7   EOS PCT % 0       Results from last 7 days  Lab Units 02/12/18  1233   SODIUM mmol/L 139   POTASSIUM mmol/L 4 4   CHLORIDE mmol/L 104   CO2 mmol/L 27   BUN mg/dL 36*   CREATININE mg/dL 1 55*   CALCIUM mg/dL 8 4   TOTAL PROTEIN g/dL 6 0*   BILIRUBIN TOTAL mg/dL 0 30   ALK PHOS U/L 62   ALT U/L 37   AST U/L 15   GLUCOSE RANDOM mg/dL 125       Results from last 7 days  Lab Units 02/12/18  1233   INR  2 80*           Imaging Studies: I have personally reviewed pertinent imaging studies  No results found      ASSESSMENT and PLAN:      Acute Blood Loss Anemia  Hematochezia  - Hb on admission 9 0 with no history of anemia but no baseline Hb [place]  - CT pending  - Lactic acid on admission 2 7; low suspicion for ischemic colitis as patient has no abdominal pain and he is on anticoagulation  - Differential diagnosis includes diverticular bleed v infectious colitis v undiagnosed inflammatory disease v AVM v ischemic colitis v less likely malignancy  - Clear liquids today, NPO after midnight  - Plan for colonoscopy tomorrow; bowel prep to start at 4 pm  - Serial abdominal exams  - Continue to monitor Hb closely; agree with transfusing 1 U PRBC due to symptoms and Afib with RVR  - S/P [facility]; recheck INR in the morning  - Hold xarelto      Patient will be seen and examined by Dr Braulio Russ  All cosme medical decisions were made by Dr Braulio Russ  Thank you for allowing us to participate in the care of this patient  We will follow with you closely

## 2018-02-13 ENCOUNTER — ANESTHESIA (INPATIENT)
Dept: PERIOP | Facility: HOSPITAL | Age: 58
DRG: 811 | End: 2018-02-13
Payer: COMMERCIAL

## 2018-02-13 ENCOUNTER — ANESTHESIA EVENT (INPATIENT)
Dept: PERIOP | Facility: HOSPITAL | Age: 58
DRG: 811 | End: 2018-02-13
Payer: COMMERCIAL

## 2018-02-13 ENCOUNTER — APPOINTMENT (INPATIENT)
Dept: MRI IMAGING | Facility: HOSPITAL | Age: 58
DRG: 811 | End: 2018-02-13
Payer: COMMERCIAL

## 2018-02-13 PROBLEM — K92.2 ACUTE GI BLEEDING: Status: ACTIVE | Noted: 2018-02-12

## 2018-02-13 LAB
ABO GROUP BLD BPU: NORMAL
ABO GROUP BLD BPU: NORMAL
ALBUMIN SERPL BCP-MCNC: 2.6 G/DL (ref 3.5–5)
ALP SERPL-CCNC: 45 U/L (ref 46–116)
ALT SERPL W P-5'-P-CCNC: 27 U/L (ref 12–78)
ANION GAP SERPL CALCULATED.3IONS-SCNC: 8 MMOL/L (ref 4–13)
AST SERPL W P-5'-P-CCNC: 15 U/L (ref 5–45)
BILIRUB SERPL-MCNC: 0.7 MG/DL (ref 0.2–1)
BPU ID: NORMAL
BPU ID: NORMAL
BUN SERPL-MCNC: 27 MG/DL (ref 5–25)
CALCIUM SERPL-MCNC: 7.9 MG/DL (ref 8.3–10.1)
CHLORIDE SERPL-SCNC: 106 MMOL/L (ref 100–108)
CO2 SERPL-SCNC: 26 MMOL/L (ref 21–32)
CREAT SERPL-MCNC: 1.04 MG/DL (ref 0.6–1.3)
CROSSMATCH: NORMAL
CROSSMATCH: NORMAL
ERYTHROCYTE [DISTWIDTH] IN BLOOD BY AUTOMATED COUNT: 14.2 % (ref 11.6–15.1)
GFR SERPL CREATININE-BSD FRML MDRD: 79 ML/MIN/1.73SQ M
GLUCOSE SERPL-MCNC: 131 MG/DL (ref 65–140)
HCT VFR BLD AUTO: 24.5 % (ref 36.5–49.3)
HCT VFR BLD AUTO: 25 % (ref 36.5–49.3)
HCT VFR BLD AUTO: 28.1 % (ref 36.5–49.3)
HCT VFR BLD AUTO: 30 % (ref 36.5–49.3)
HGB BLD-MCNC: 10.1 G/DL (ref 12–17)
HGB BLD-MCNC: 8.3 G/DL (ref 12–17)
HGB BLD-MCNC: 8.5 G/DL (ref 12–17)
HGB BLD-MCNC: 9.3 G/DL (ref 12–17)
INR PPP: 1.54 (ref 0.86–1.16)
LACTATE SERPL-SCNC: 1 MMOL/L (ref 0.5–2)
LACTATE SERPL-SCNC: 1.1 MMOL/L (ref 0.5–2)
LACTATE SERPL-SCNC: 2.4 MMOL/L (ref 0.5–2)
MCH RBC QN AUTO: 30.7 PG (ref 26.8–34.3)
MCHC RBC AUTO-ENTMCNC: 34 G/DL (ref 31.4–37.4)
MCV RBC AUTO: 90 FL (ref 82–98)
PLATELET # BLD AUTO: 130 THOUSANDS/UL (ref 149–390)
PMV BLD AUTO: 11.5 FL (ref 8.9–12.7)
POTASSIUM SERPL-SCNC: 4.4 MMOL/L (ref 3.5–5.3)
PROT SERPL-MCNC: 4.9 G/DL (ref 6.4–8.2)
PROTHROMBIN TIME: 18.9 SECONDS (ref 12.1–14.4)
RBC # BLD AUTO: 2.77 MILLION/UL (ref 3.88–5.62)
SODIUM SERPL-SCNC: 140 MMOL/L (ref 136–145)
TROPONIN I SERPL-MCNC: <0.02 NG/ML
UNIT DISPENSE STATUS: NORMAL
UNIT DISPENSE STATUS: NORMAL
UNIT PRODUCT CODE: NORMAL
UNIT PRODUCT CODE: NORMAL
UNIT RH: NORMAL
UNIT RH: NORMAL
WBC # BLD AUTO: 18.41 THOUSAND/UL (ref 4.31–10.16)

## 2018-02-13 PROCEDURE — C9113 INJ PANTOPRAZOLE SODIUM, VIA: HCPCS | Performed by: INTERNAL MEDICINE

## 2018-02-13 PROCEDURE — 85027 COMPLETE CBC AUTOMATED: CPT | Performed by: PHYSICIAN ASSISTANT

## 2018-02-13 PROCEDURE — 80053 COMPREHEN METABOLIC PANEL: CPT | Performed by: GENERAL PRACTICE

## 2018-02-13 PROCEDURE — 85018 HEMOGLOBIN: CPT | Performed by: GENERAL PRACTICE

## 2018-02-13 PROCEDURE — 85014 HEMATOCRIT: CPT | Performed by: GENERAL PRACTICE

## 2018-02-13 PROCEDURE — 99254 IP/OBS CNSLTJ NEW/EST MOD 60: CPT | Performed by: PHYSICIAN ASSISTANT

## 2018-02-13 PROCEDURE — 83605 ASSAY OF LACTIC ACID: CPT | Performed by: PHYSICIAN ASSISTANT

## 2018-02-13 PROCEDURE — P9021 RED BLOOD CELLS UNIT: HCPCS

## 2018-02-13 PROCEDURE — 85018 HEMOGLOBIN: CPT | Performed by: INTERNAL MEDICINE

## 2018-02-13 PROCEDURE — 0DJD8ZZ INSPECTION OF LOWER INTESTINAL TRACT, VIA NATURAL OR ARTIFICIAL OPENING ENDOSCOPIC: ICD-10-PCS | Performed by: INTERNAL MEDICINE

## 2018-02-13 PROCEDURE — 85610 PROTHROMBIN TIME: CPT | Performed by: PHYSICIAN ASSISTANT

## 2018-02-13 PROCEDURE — 84484 ASSAY OF TROPONIN QUANT: CPT | Performed by: PHYSICIAN ASSISTANT

## 2018-02-13 PROCEDURE — 85014 HEMATOCRIT: CPT | Performed by: INTERNAL MEDICINE

## 2018-02-13 PROCEDURE — 45378 DIAGNOSTIC COLONOSCOPY: CPT | Performed by: INTERNAL MEDICINE

## 2018-02-13 PROCEDURE — 99233 SBSQ HOSP IP/OBS HIGH 50: CPT | Performed by: INTERNAL MEDICINE

## 2018-02-13 RX ORDER — SODIUM CHLORIDE, SODIUM LACTATE, POTASSIUM CHLORIDE, CALCIUM CHLORIDE 600; 310; 30; 20 MG/100ML; MG/100ML; MG/100ML; MG/100ML
INJECTION, SOLUTION INTRAVENOUS CONTINUOUS PRN
Status: DISCONTINUED | OUTPATIENT
Start: 2018-02-13 | End: 2018-02-13 | Stop reason: SURG

## 2018-02-13 RX ORDER — DILTIAZEM HYDROCHLORIDE 120 MG/1
120 CAPSULE, COATED, EXTENDED RELEASE ORAL DAILY
Status: DISCONTINUED | OUTPATIENT
Start: 2018-02-13 | End: 2018-02-15 | Stop reason: HOSPADM

## 2018-02-13 RX ORDER — METOPROLOL TARTRATE 5 MG/5ML
5 INJECTION INTRAVENOUS EVERY 6 HOURS PRN
Status: DISCONTINUED | OUTPATIENT
Start: 2018-02-13 | End: 2018-02-15 | Stop reason: HOSPADM

## 2018-02-13 RX ORDER — PROPOFOL 10 MG/ML
INJECTION, EMULSION INTRAVENOUS AS NEEDED
Status: DISCONTINUED | OUTPATIENT
Start: 2018-02-13 | End: 2018-02-13 | Stop reason: SURG

## 2018-02-13 RX ORDER — PROCHLORPERAZINE MALEATE 10 MG
5 TABLET ORAL EVERY 6 HOURS PRN
Status: DISCONTINUED | OUTPATIENT
Start: 2018-02-13 | End: 2018-02-15 | Stop reason: HOSPADM

## 2018-02-13 RX ORDER — DILTIAZEM HYDROCHLORIDE 5 MG/ML
5 INJECTION INTRAVENOUS ONCE
Status: DISCONTINUED | OUTPATIENT
Start: 2018-02-13 | End: 2018-02-13

## 2018-02-13 RX ORDER — METOPROLOL TARTRATE 5 MG/5ML
5 INJECTION INTRAVENOUS EVERY 6 HOURS PRN
Status: DISCONTINUED | OUTPATIENT
Start: 2018-02-13 | End: 2018-02-13

## 2018-02-13 RX ORDER — DIGOXIN 0.25 MG/ML
250 INJECTION INTRAMUSCULAR; INTRAVENOUS ONCE
Status: COMPLETED | OUTPATIENT
Start: 2018-02-13 | End: 2018-02-13

## 2018-02-13 RX ADMIN — SODIUM CHLORIDE 500 ML: 0.9 INJECTION, SOLUTION INTRAVENOUS at 07:49

## 2018-02-13 RX ADMIN — PROPOFOL 130 MG: 10 INJECTION, EMULSION INTRAVENOUS at 14:10

## 2018-02-13 RX ADMIN — METOPROLOL TARTRATE 25 MG: 25 TABLET ORAL at 09:19

## 2018-02-13 RX ADMIN — PROCHLORPERAZINE MALEATE 5 MG: 10 TABLET, FILM COATED ORAL at 02:22

## 2018-02-13 RX ADMIN — DILTIAZEM HYDROCHLORIDE 120 MG: 120 CAPSULE, COATED, EXTENDED RELEASE ORAL at 09:19

## 2018-02-13 RX ADMIN — DILTIAZEM HYDROCHLORIDE 120 MG: 120 CAPSULE, COATED, EXTENDED RELEASE ORAL at 18:31

## 2018-02-13 RX ADMIN — SODIUM CHLORIDE, SODIUM LACTATE, POTASSIUM CHLORIDE, AND CALCIUM CHLORIDE: .6; .31; .03; .02 INJECTION, SOLUTION INTRAVENOUS at 14:05

## 2018-02-13 RX ADMIN — PROPOFOL 40 MG: 10 INJECTION, EMULSION INTRAVENOUS at 14:16

## 2018-02-13 RX ADMIN — HYDROCODONE BITARTRATE AND ACETAMINOPHEN 1 TABLET: 5; 325 TABLET ORAL at 20:05

## 2018-02-13 RX ADMIN — PROPOFOL 30 MG: 10 INJECTION, EMULSION INTRAVENOUS at 14:20

## 2018-02-13 RX ADMIN — MONTELUKAST SODIUM 10 MG: 10 TABLET, FILM COATED ORAL at 22:00

## 2018-02-13 RX ADMIN — FLUTICASONE PROPIONATE 1 PUFF: 220 AEROSOL, METERED RESPIRATORY (INHALATION) at 01:35

## 2018-02-13 RX ADMIN — PANTOPRAZOLE SODIUM 40 MG: 40 INJECTION, POWDER, FOR SOLUTION INTRAVENOUS at 06:42

## 2018-02-13 RX ADMIN — METOPROLOL TARTRATE 25 MG: 25 TABLET ORAL at 15:30

## 2018-02-13 RX ADMIN — METOPROLOL TARTRATE 25 MG: 25 TABLET ORAL at 21:15

## 2018-02-13 RX ADMIN — PROPOFOL 30 MG: 10 INJECTION, EMULSION INTRAVENOUS at 14:23

## 2018-02-13 RX ADMIN — PROPOFOL 30 MG: 10 INJECTION, EMULSION INTRAVENOUS at 14:13

## 2018-02-13 RX ADMIN — SODIUM CHLORIDE 8 MG/HR: 9 INJECTION, SOLUTION INTRAVENOUS at 09:11

## 2018-02-13 RX ADMIN — DIGOXIN 250 MCG: 0.25 INJECTION INTRAMUSCULAR; INTRAVENOUS at 08:07

## 2018-02-13 NOTE — CONSULTS
Consultation - Cardiology Team One  [place] Sensor 61 y o  male MRN: [id number]  Unit/Bed#: -01 Encounter: [id number]    Inpatient consult to Cardiology  Consult performed by: Suki Agarwal  Consult ordered by: Kassidy Crawford          Physician Requesting Consult: Hanny Crenshaw, *  Reason for Consult / Principal Problem: Rapid A  Fib    HPI: Cardiologist Dr Kathy Davidson is a 61y o  year old male who has a history of chronic A fib on Xarelto, hypertension presenting with AFib with RVR in the setting of acute blood loss anemia for 1 day  He noticed bright red blood after wiping  Throughout the day, patient had progressive weakness and SOB  Patient was admitted and noted to be in AFib with RVR on telemetry  Denies chest pain, leg swelling  Of note, patient was on both diclofenac and Xarelto at home  Patient is scheduled for colonoscopy later today  Has cardiologist in [place]      REVIEW OF SYSTEMS:  Constitutional:  +fatigue, Denies fever or chills   Eyes:  Denies change in visual acuity   HENT:  Denies nasal congestion or sore throat   Respiratory:  +SOB  Cardiovascular:  Denies chest pain or edema   GI:  +bloody stool, Denies abdominal pain, nausea, vomiting  :  Denies dysuria, frequency, difficulty in micturition and nocturia  Musculoskeletal:  Denies back pain or joint pain   Neurologic:  Denies headache, focal weakness or sensory changes   Endocrine:  Denies polyuria or polydipsia   Lymphatic:  Denies swollen glands   Psychiatric:  Denies depression or anxiety     Historical Information   Past Medical History:   Diagnosis Date    [facility]     Asthma     Hypertension      Past Surgical History:   Procedure Laterality Date    JOINT REPLACEMENT      ROTATOR CUFF REPAIR       History   Alcohol Use No     History   Drug Use No     History   Smoking Status    Never Smoker   Smokeless Tobacco    Never Used       Family History:   Family History   Problem Relation Age of Onset    Heart disease Father     Heart disease Brother     Asthma Brother        MEDS & ALLERGIES:  all current active meds have been reviewed and current meds: Current Facility-Administered Medications   Medication Dose Route Frequency    acetaminophen (TYLENOL) tablet 650 mg  650 mg Oral Q6H PRN    albuterol (PROVENTIL HFA,VENTOLIN HFA) inhaler 2 puff  2 puff Inhalation Q6H PRN    digoxin (LANOXIN) injection 250 mcg  250 mcg Intravenous Once    diltiazem (CARDIZEM CD) 24 hr capsule 120 mg  120 mg Oral Daily    fluticasone (FLOVENT HFA) 220 mcg/act inhaler 1 puff  1 puff Inhalation BID    HYDROcodone-acetaminophen (NORCO) 5-325 mg per tablet 1 tablet  1 tablet Oral Q6H PRN    metoprolol (LOPRESSOR) injection 5 mg  5 mg Intravenous Q6H PRN    montelukast (SINGULAIR) tablet 10 mg  10 mg Oral HS    pantoprazole (PROTONIX) 80 mg in sodium chloride 0 9 % 100 mL infusion  8 mg/hr Intravenous Continuous    prochlorperazine (COMPAZINE) tablet 5 mg  5 mg Oral Q6H PRN    sodium chloride 0 9 % bolus 500 mL  500 mL Intravenous Q4H PRN    sodium chloride 0 9 % infusion  150 mL/hr Intravenous Continuous       pantoprozole (PROTONIX) infusion (Continuous) 8 mg/hr    sodium chloride 150 mL/hr Last Rate: 150 mL/hr (02/13/18 0837)     Allergies   Allergen Reactions    Penicillins Hives       OBJECTIVE:  Vitals:   Vitals:    02/13/18 0840   BP: 142/89   Pulse: (!) 116   Resp: 18   Temp: 98 8 °F (37 1 °C)   SpO2:      Body mass index is 38 8 kg/m²      Systolic (05BHC), ARC:720 , Min:101 , FWA:456     Diastolic (46ZJN), JEP:92, Min:40, Max:98      Intake/Output Summary (Last 24 hours) at 02/13/18 0906  Last data filed at 02/13/18 0837   Gross per 24 hour   Intake             1535 ml   Output              800 ml   Net              735 ml     Weight (last 2 days)     Date/Time   Weight    02/12/18 2126  109 (240 4)    02/12/18 1209  109 (240 4)            Invasive Devices     Peripheral Intravenous Line Peripheral IV 02/12/18 Left Antecubital less than 1 day    Peripheral IV 02/12/18 Right Antecubital less than 1 day                PHYSICAL EXAMS:  General:  Patient is not in acute distress, laying in the bed comfortably, awake, alert responding to commands  Head: Normocephalic, Atraumatic  HEENT: White sclera, pink conjunctiva,  PERRLA,pharynx benign  Neck:  Supple, no neck vein distention, carotids+2/+2 no bruits, thyromegaly, adenopathy  Respiratory: clear to P/A  Cardiovascular: + tachycardia,+ irregular irregular  PMI normal, S1-S2 normal, No  Murmurs, thrills, gallops, rubs   GI:  Abdomen soft nontender   No hepatosplenomegaly, adenopathy, ascites,or rebound tenderness  Extremities: No edema, normal pulses, no calf tenderness, no joint deformities, no venous disease   Integument:  No skin rashes or ulceration  Lymphatic:  No cervical or inguinal lymphadenopathy  Neurologic:  Patient is awake alert, responding to command, well-oriented to time and place and person moving all extremities    LABORATORY RESULTS:    Results from last 7 days  Lab Units 02/13/18  0310 02/12/18  1233   TROPONIN I ng/mL <0 02 <0 02     CBC with diff:   Results from last 7 days  Lab Units 02/13/18  0812 02/13/18  0704 02/12/18  2253 02/12/18  1923 02/12/18  1233   WBC Thousand/uL  --  18 41*  --   --  16 81*   HEMOGLOBIN g/dL 8 3* 8 5* 9 9* 8 9* 9 0*   HEMATOCRIT % 24 5* 25 0*  --  27 4* 27 8*   MCV fL  --  90  --   --  94   PLATELETS Thousands/uL  --  130*  --   --  175   MCH pg  --  30 7  --   --  30 4   MCHC g/dL  --  34 0  --   --  32 4   RDW %  --  14 2  --   --  13 2   MPV fL  --  11 5  --   --  11 3   NRBC AUTO /100 WBCs  --   --   --   --  0       CMP:  Results from last 7 days  Lab Units 02/13/18  0704 02/12/18  1233   SODIUM mmol/L 140 139   POTASSIUM mmol/L 4 4 4 4   CHLORIDE mmol/L 106 104   CO2 mmol/L 26 27   ANION GAP mmol/L 8 8   BUN mg/dL 27* 36*   CREATININE mg/dL 1 04 1 55*   GLUCOSE RANDOM mg/dL 131 125 CALCIUM mg/dL 7 9* 8 4   AST U/L 15 15   ALT U/L 27 37   ALK PHOS U/L 45* 62   TOTAL PROTEIN g/dL 4 9* 6 0*   BILIRUBIN TOTAL mg/dL 0 70 0 30   EGFR ml/min/1 73sq m 79 49       BMP:  Results from last 7 days  Lab Units 02/13/18  0704 02/12/18  1233   SODIUM mmol/L 140 139   POTASSIUM mmol/L 4 4 4 4   CHLORIDE mmol/L 106 104   CO2 mmol/L 26 27   BUN mg/dL 27* 36*   CREATININE mg/dL 1 04 1 55*   GLUCOSE RANDOM mg/dL 131 125   CALCIUM mg/dL 7 9* 8 4                          Results from last 7 days  Lab Units 02/13/18  0705 02/12/18  1233   INR  1 54* 2 80*       Lipid Profile:   No results found for: CHOL  No results found for: HDL  No results found for: LDLCALC  No results found for: TRIG    Cardiac testing:   No results found for this or any previous visit  No results found for this or any previous visit  No procedure found  No results found for this or any previous visit  Imaging:   I have personally reviewed pertinent reports  EKG reviewed personally:  AFib with RVR    Telemetry reviewed personally:   AFib with RVR, HR 120s to 130s currently  Intermittent spikes of HR greater than 150s    Assessment/Plan:  1  Atrial fibrillation with RVR:  In the setting of acute blood loss anemia  HR 120s to 130s currently  Will start Lopressor 25 mg Q 6  Anticoagulation agents held for now until bleeding resolves  Last recorded hemoglobin 8 3  Patient will be undergoing colonoscopy later today to evaluate for source of bleeding  Continue to monitor  2   Hypertension:  Last recorded /89  Continue present regimen  Will add Lopressor 25 mg Q 6  Code Status: Level 1 - Full Code    Counseling / Coordination of Care  Total floor / unit time spent today 35 minutes  Greater than 50% of total time was spent with the patient and / or family counseling and / or coordination of care    A description of the counseling / coordination of care: Review of history, current assessment, development of a plan     Michael Rodarte PA-C  2/13/2018,9:06 AM

## 2018-02-13 NOTE — CASE MANAGEMENT
Initial Clinical Review    Admission: Date/Time/Statement: 2/12/18 @ 1925    Orders Placed This Encounter   Procedures    Inpatient Admission (expected length of stay for this patient is greater than two midnights)     Standing Status:   Standing     Number of Occurrences:   1     Order Specific Question:   Admitting Physician     Answer:   Shi Reynoso [[id number]]     Order Specific Question:   Level of Care     Answer:   Med Surg [[id number]]     Order Specific Question:   Bed request comments     Answer:   tele     Order Specific Question:   Estimated length of stay     Answer:   More than 2 Midnights     Order Specific Question:   Certification     Answer:   I certify that inpatient services are medically necessary for this patient for a duration of greater than two midnights  See H&P and MD Progress Notes for additional information about the patient's course of treatment  ED: Date/Time/Mode of Arrival:   ED Arrival Information     Expected Arrival Acuity Means of Arrival Escorted By Service Admission Type    - 2/12/2018 12:04 Emergent Ambulance [facility] Medicine Emergency    Arrival Complaint    RECTAL BLEEDING          Chief Complaint:   Chief Complaint   Patient presents with    Black or Bloody Stool     Pt c/o of single episode of gross michael blood in stool today  History of Illness: Blanka Miranda is a 61 y o  male with significant past medical history of hypertension and AFib who presents with rectal bleeding x1 day  Patient reports that his wife and family were recently sick with the flu and he began to have symptoms of congestion and sore throat starting on Tuesday  Reports that he went to his family doctor on Wednesday and was given prophylactic Tamiflu  Reports that his last day was today  He states that his symptoms began to improve on the Tamiflu  He reports that today he was at a conference and felt the need to defecate    He reports noticing bright red blood after wiping  He states that he did not think much of it and went back to the conference  However throughout the day the patient became very weak and short of breath with ambulation  He states that he was so weak that they had wheelchair him to his room  He reports that he had about 6-7 episodes of bloody diarrhea today  Denies any previous history of this  He denies abdominal pain, nausea and vomiting  Patient also reported that he felt lightheaded at that time  He reports now that after he has gotten a few units of blood that his weakness has improved  Patient is on Xarelto for management in Afib  Patient also reports history of asthma  ED Vital Signs:   ED Triage Vitals [02/12/18 1209]   Temperature Pulse Respirations Blood Pressure SpO2   98 °F (36 7 °C) 90 18 (!) 101/40 98 %      Temp Source Heart Rate Source Patient Position - Orthostatic VS BP Location FiO2 (%)   Oral Monitor Lying Right arm --      Pain Score       No Pain        Wt Readings from Last 1 Encounters:   02/12/18 109 kg (240 lb 6 4 oz)       Vital Signs (abnormal):   -150    /52, 102/61, 102/71, 108/58    Abnormal Labs/Diagnostic Test Results:     CBC on arrival:   WBC 16 81,   Hgb 9 0  Hct 27 8     serial H/H:   8 9/27 4, 8 5/25 5    Lab Units 02/12/18  1233   SODIUM mmol/L 139   POTASSIUM mmol/L 4 4   CHLORIDE mmol/L 104   CO2 mmol/L 27   BUN mg/dL 36*   CREATININE mg/dL 1 55*   CALCIUM mg/dL 8 4   TOTAL PROTEIN g/dL 6 0*   BILIRUBIN TOTAL mg/dL 0 30   ALK PHOS U/L 62   ALT U/L 37   AST U/L 15   GLUCOSE RANDOM mg/dL 125       INR 2 80  Lactic acid 2 7, 2 2, 2 8    CT Abd/Pelvis  1  Colonic diverticulosis without acute diverticulitis  Scattered air-fluid levels in the colon, nonspecific but may indicate underlying diarrhea  2   1 4 cm indeterminant heterogeneous left adrenal nodule with scattered calcifications   Although its imaging features are indeterminate, it does demonstrate some suspicious features such as heterogeneity and calcifications, therefore recommend adrenal    mass protocol CT or MRI for further characterization  3   Small fat-containing left inguinal hernia  Moderate-sized fat-containing periumbilical hernia  4   Bilateral spondylolysis defects at L5 with grade 1-2 anterolisthesis of L5 on S1  Mild to moderate posterior wedging of L5 may be degenerative          EKG Atrial fibrillation with rapid ventricular response with premature ventricular or aberrantly conducted complexes  Nonspecific ST and T wave abnormality    ED Treatment:   Medication Administration from 02/12/2018 1204 to 02/12/2018 2043       Date/Time Order Dose Route Action Comments     02/12/2018 1410 sodium chloride 0 9 % bolus 1,000 mL 1,000 mL Intravenous New Bag      02/12/2018 1411 ondansetron (ZOFRAN) injection 4 mg 4 mg Intravenous Given      02/12/2018 1447 prothrombin complex conc human (KCENTRA) IV 2,700 Units 2,700 Units Intravenous Given      02/12/2018 1550 iohexol (OMNIPAQUE) 350 MG/ML injection (MULTI-DOSE) 100 mL 100 mL Intravenous Given      02/12/2018 2015 lactated ringers bolus 1,000 mL 1,000 mL Intravenous New Bag           Past Medical/Surgical History:        Diagnosis Date    [facility] ([facility] [address] )     Asthma     Hypertension        Admitting Diagnosis: Acute blood loss anemia [D62]  Rectal bleeding [K62 5]  Acute lower GI bleeding [K92 2]  Bloody diarrhea [R19 7]  Rapid atrial fibrillation (HCC) [I48 91]    Age/Sex: 61 y o  male    Assessment/Plan:   * Acute blood loss anemia   Assessment & Plan     · Patient with BRBPR  · GI following, recommending IV fluid resuscitation, bowel prep for anticipated colonoscopy tomorrow with possible EGD depending on colonoscopy results    · Hold Xarelto  · Continue clear liquid diet with NPO after midnight  · Protonix 40 mg b i d   · Patient's hemoglobin 9 on admission, receiving 2 units packed red blood cells with hemoglobin stable at 8 9, will order another 1 unit of blood due to patient actively bleeding  · Telemetry  · Closely monitor hemoglobin every 4 hours          CAYETANO (acute kidney injury) ([facility] [address] )   Assessment & Plan     · Creatinine 1 55  · Baseline unknown, likely a KI due to volume depletion  · Will give IV fluids 75 milligrams/hour  · Assess with CMP tomorrow          Hypertension   Assessment & Plan     · Most recent /80  · Continue digoxin  · Will add p r n  Hydralazine  · Patient's diltiazem on hold due to previous low blood pressures, continue to hold in appreciate Cardiology recommendations tomorrow          Rapid atrial fibrillation [facility])   Assessment & Plan     · Patient with tachycardia on exam, in the 120s to 130s, mild improvement after dose of digoxin  · Likely related to acute illness  · Patient with some soft BP here, add digoxin 250 mcg 1 dose, patient with improvement, continue to monitor   · Telemetry  · Patient asymptomatic at this time          Rectal bleeding   Assessment & Plan     · Plan as above             VTE Prophylaxis: Pharmacologic VTE Prophylaxis contraindicated due to GI bleed  / sequential compression device     Code Status:  Level 1-full code, discussed with patient at bedside    Anticipated Length of Stay:  Patient will be admitted on an Inpatient basis with an anticipated length of stay of  > 2 midnights     Justification for Hospital Stay:  Monitoring hemoglobin, colonoscopy/EGD, blood transfusion      Admission Orders:  Scheduled Meds:   Current Facility-Administered Medications:  acetaminophen 650 mg Oral Q6H PRN    albuterol 2 puff Inhalation Q6H PRN    fluticasone 1 puff Inhalation BID    HYDROcodone-acetaminophen 1 tablet Oral Q6H PRN    metoprolol 5 mg Intravenous Q6H PRN    metoprolol tartrate 25 mg Oral Q6H    montelukast 10 mg Oral HS    pantoprozole (PROTONIX) infusion (Continuous) 8 mg/hr Intravenous Continuous Last Rate: 8 mg/hr (02/13/18 0911)   prochlorperazine 5 mg Oral Q6H PRN    sodium chloride 500 mL Intravenous Q4H PRN sodium chloride 150 mL/hr Intravenous Continuous Last Rate: 150 mL/hr (02/13/18 0837)     Continuous Infusions:   pantoprozole (PROTONIX) infusion (Continuous) 8 mg/hr Last Rate: 8 mg/hr (02/13/18 0911)   sodium chloride 150 mL/hr Last Rate: 150 mL/hr (02/13/18 0837)     PRN Meds:   acetaminophen    albuterol    HYDROcodone-acetaminophen    metoprolol    prochlorperazine    sodium chloride    NPO  MRI left adrenal mass  H/H q 6 hrs  CMP, CBC 2/14  Consult gastroenterology  SCD's Telemetry  Transfuse 1 unit PRBC

## 2018-02-13 NOTE — ASSESSMENT & PLAN NOTE
· Creatinine 1 55  · Baseline unknown, likely a KI due to volume depletion  · Will give IV fluids 75 milligrams/hour  · Assess with CMP tomorrow

## 2018-02-13 NOTE — PROGRESS NOTES
[facility] Progress Note  Patient: Radha Huber 61 y o  male   MRN: [id number]  PCP: No primary care provider on file  Unit/Bed#: -01 Encounter: [id number]  Date Of Visit: 02/13/18    Assessment/Plan:    Principal Problem:    Acute blood loss anemia  Active Problems:    Acute GI bleeding    Atrial fibrillation with RVR ([facility])    Hypertension    CAYETANO (acute kidney injury) ([facility] )    Present on Admission:   Acute blood loss anemia   Acute GI bleeding   Atrial fibrillation with RVR ([facility] )   Hypertension   CAYETANO (acute kidney injury) ([facility] )      · Acute blood loss anemia secondary to acute GI bleed  Upper versus lower  Patient is on Xarelto and he also has been taking diclofenac for his shoulder pains twice daily  With heart rate still being on the higher side with history of underlying atrial fibrillation, he is going to get another unit of blood transfusion  Would increase the rate of his IV fluids  Would also give him p r n  IV fluid boluses for low blood pressure and elevated heart rate  Likely lower GI bleed-diverticulosis, however, as he is on NSAIDs in addition to being on Xarelto, there is also possibility of significant upper GI bleed  Would changes IV PPI to IV PPI drip  GI already has evaluated patient for GI workup  Continue to follow his hemoglobin serially and transfuse blood as needed  His anticoagulation would be on hold  · Atrial fibrillation with RVR  Blood pressure is better now  He has been loaded with IV digoxin  He is also on oral Cardizem which I would continue  However, would also give him IV Lopressor p r n  for elevated heart rate as his blood pressure is improving  If his blood pressure stays stable and he continues to be tachycardic, he may need to be put on IV Cardizem drip  · Acute kidney injury-hemodynamic likely secondary to acute GI bleed  Creatinine improving  Continue with IV fluids and follow-up labs    · Leucocytosis/ Thrombocytopenia: Leukocytosis could be reactive, however, his platelets are going down  · Coagulopathy  INR was elevated  On Xarelto  No real specific reversal agent available  · Anxiety disorder  Encouraged him to stay calm      Note:  Given patient's continued tachycardia and significant GI bleed, I think he would be better served at a higher level of care-step-down unit  He may need continued IV Cardizem drip control his heart rate-especially blood pressure stays stable  I have discussed with critical care team myself  If no bed available critical care unit he need to be transferred here in other facility    Patient's condition is guarded  VTE Pharmacologic Prophylaxis:   Pharmacologic: Pharmacologic VTE Prophylaxis contraindicated due to Acute GI bleed  Mechanical VTE Prophylaxis in Place:  Yes  Patient Centered Rounds: I have performed bedside rounds with nursing staff today  Discussions with Specialists or Other Care Team Provider:  Yes    Education and Discussions with Family / Patient:  Yes    Time Spent for Care: 45 minutes  More than 50% of total time spent on counseling and coordination of care as described above  Current Length of Stay: 1 day(s)    Current Patient Status: Inpatient   Certification Statement: The patient will continue to require additional inpatient hospital stay due to Acute GI bleeding    Discharge Plan:   Home when stable    Code Status: Level 1 - Full Code      Subjective:   Patient feels okay  He has been prepped for GI workup  He has been nauseous this morning  He vomited couple of times last night  Feels panicky and very anxious  Denies any abdominal pain  He started having this some bleeding yesterday afternoon when actually he was in a meeting  He had about 3 bowel movements which were bloody before coming to the hospital and then afterwards and others 3 loose bowel movements that were bloody as well  He denies any chest pain  He was dizzy and lightheaded    Denies any fever or chills  He denies any shortness of breath    Objective:     Vitals:   Temp (24hrs), Av 1 °F (36 7 °C), Min:97 7 °F (36 5 °C), Max:98 5 °F (36 9 °C)    HR:  [] 150  Resp:  [16-20] 18  BP: (101-148)/(40-98) 117/80  SpO2:  [87 %-98 %] 96 %  Body mass index is 38 8 kg/m²  Input and Output Summary (last 24 hours): Intake/Output Summary (Last 24 hours) at 18 0833  Last data filed at 18 0816   Gross per 24 hour   Intake             1535 ml   Output              600 ml   Net              935 ml           Physical Exam:     Vital signs are reviewed as above  Constitutional:  Patient in bed  Patient laying comfortably  Eyes: EOM grossly intact  Conjunctivae are pale  Patient has anicteric sclera  HENT: Oropharynx are slightly dry   Did not notice any significant lesions on the tongue  Head normocephalic  Neck: Neck is obese and supple  I was not able to visualize any JVD at 90°  There is no significant lymphadenopathy  I also did not notice any significant thyromegaly  Cardiac: I did not hear any rubs or gallop  Patient has irregularly irregular rhythm  Heart rate going into 150s at times  Respiratory: Patient not in significant respiratory distress  Air entry in general is fair  GI: Abdomen is obese and soft  It is grossly nontender  Bowel sounds are adequate  I was not able to appreciate any hepatosplenomegaly  Neurologic:  Patient is awake and alert  Neurological examination is grossly intact  No obvious focal neurological deficit noticed  Skin: Skin is warm and dry  Psychiatric: Mood and affect are pleasant  Musculoskeletal  Patient moving all extremities    Has chronic pain and sure   Extremities: Patient has no significant cyanosis, clubbing, or lower extremity edema       Additional Data:     Labs:      Results from last 7 days  Lab Units 18  0812 18  0704  18  1233   WBC Thousand/uL  --  18 41*  --  16 81*   HEMOGLOBIN g/dL 8 3* 8 5*  < > 9 0* HEMATOCRIT % 24 5* 25 0*  < > 27 8*   PLATELETS Thousands/uL  --  130*  --  175   NEUTROS PCT %  --   --   --  81*   LYMPHS PCT %  --   --   --  11*   MONOS PCT %  --   --   --  7   EOS PCT %  --   --   --  0   < > = values in this interval not displayed  Results from last 7 days  Lab Units 02/13/18  0704   SODIUM mmol/L 140   POTASSIUM mmol/L 4 4   CHLORIDE mmol/L 106   CO2 mmol/L 26   BUN mg/dL 27*   CREATININE mg/dL 1 04   CALCIUM mg/dL 7 9*   TOTAL PROTEIN g/dL 4 9*   BILIRUBIN TOTAL mg/dL 0 70   ALK PHOS U/L 45*   ALT U/L 27   AST U/L 15   GLUCOSE RANDOM mg/dL 131       Results from last 7 days  Lab Units 02/13/18  0705   INR  1 54*       * I Have Reviewed All Lab Data Listed Above  * Additional Pertinent Lab Tests Reviewed:  All Labs Within Last 24 Hours Reviewed      Recent Cultures (last 7 days):           Last 24 Hours Medication List:     Current Facility-Administered Medications:  acetaminophen 650 mg Oral Q6H PRN Rocio Gramajo MD    albuterol 2 puff Inhalation Q6H PRN Rocio Gramajo MD    digoxin 250 mcg Intravenous Once Ghassan Bonilla PA-C    diltiazem 120 mg Oral Daily Rocio Gramajo MD    fluticasone 1 puff Inhalation BID Rocio Gramajo MD    HYDROcodone-acetaminophen 1 tablet Oral Q6H PRN [facility] MD Flower    metoprolol 5 mg Intravenous Q6H PRN Elba Morgan MD    montelukast 10 mg Oral HS Allyn A MD Flower    pantoprozole (PROTONIX) infusion (Continuous) 8 mg/hr Intravenous Continuous Elba Morgan MD    prochlorperazine 5 mg Oral Q6H PRN Ghassan Bonilla PA-C    sodium chloride 500 mL Intravenous Once Raulito Fanny Crenshaw MD Last Rate: 500 mL (02/13/18 0749)   sodium chloride 500 mL Intravenous Q4H PRN Elba Morgan MD    sodium chloride 150 mL/hr Intravenous Continuous Elba Morgan MD Last Rate: 100 mL/hr (02/13/18 0018)        Today, Patient Was Seen By: Elba Morgan MD    ** Please Note: Dragon 360 Dictation voice to text software may have been used in the creation of this document   **

## 2018-02-13 NOTE — PLAN OF CARE
CARDIOVASCULAR - ADULT     Maintains optimal cardiac output and hemodynamic stability Progressing     Absence of cardiac dysrhythmias or at baseline rhythm Progressing        HEMATOLOGIC - ADULT     Maintains hematologic stability Progressing        METABOLIC, FLUID AND ELECTROLYTES - ADULT     Electrolytes maintained within normal limits Progressing     Fluid balance maintained Progressing        Potential for Falls     Patient will remain free of falls Progressing

## 2018-02-13 NOTE — ASSESSMENT & PLAN NOTE
· Most recent /80  · Continue digoxin  · Will add p r n   Hydralazine  · Patient's diltiazem on hold due to previous low blood pressures, continue to hold in appreciate Cardiology recommendations tomorrow

## 2018-02-13 NOTE — PLAN OF CARE
Problem: DISCHARGE PLANNING - CARE MANAGEMENT  Goal: Discharge to post-acute care or home with appropriate resources  INTERVENTIONS:  - Conduct assessment to determine patient/family and health care team treatment goals, and need for post-acute services based on payer coverage, community resources, and patient preferences, and barriers to discharge  - Address psychosocial, clinical, and financial barriers to discharge as identified in assessment in conjunction with the patient/family and health care team  - Arrange appropriate level of post-acute services according to patients   needs and preference and payer coverage in collaboration with the physician and health care team  - Communicate with and update the patient/family, physician, and health care team regarding progress on the discharge plan  - Arrange appropriate transportation to post-acute venues  Outcome: Progressing  CM met with pt, wife, and daughter at bedside  Pt is visiting here from [place] is on a business trip and staying at [facility]  He normally lives in a one story house with 4 steps w/railing to enter the residence  Pt has no problem navigating steps and is independent with ADL's  He uses no DME's  He has never been in rehab or used [facility] services  He used OP/PT 3 years ago for a back injury  Denies substance abuse or mental health issues  He does not have a POA or Advanced Directive  CM supplied info on both  He uses [email or web address] and has no problem with his co-pays  His PCP is Dr Maritza Gastelum  Denies pain at the present time  He does work and he does drive  His wife will transport home to [place] when medically cleared     No needs identified  CM reviewed discharge planning process including the following: identifying help at home, patient preference for discharge planning needs, pharmacy preference, and availability of treatment team to discuss questions or concerns patient and/or family may have regarding understanding medications and recognizing signs and symptoms once discharged  CM also encouraged patient to follow up with all recommended appointments after discharge  Patient advised of importance for patient and family to participate in managing patients medical well being

## 2018-02-13 NOTE — ANESTHESIA POSTPROCEDURE EVALUATION
Post-Op Assessment Note      CV Status:  Stable    Mental Status:  Alert and awake    Hydration Status:  Euvolemic    PONV Controlled:  Controlled    Airway Patency:  Patent    Post Op Vitals Reviewed: Yes          Staff: CRNA           BP   132/88   Temp      Pulse 117   Resp   17   SpO2   99

## 2018-02-13 NOTE — ANESTHESIA PREPROCEDURE EVALUATION
Review of Systems/Medical History    Chart reviewed  No history of anesthetic complications     Cardiovascular  Exercise tolerance: good,  Hypertension , Dysrhythmias, atrial fibrillation,   Comment: xarelto last dose 2/13/18,  Pulmonary  Asthma: well controlled/ stable Last rescue: < 1 week ago , Sleep apnea Sleep Study completed,   Comment: In process of getting CPAP     GI/Hepatic    GI bleeding ,   Comment: 3 unit of PRBCS given 9 3     Negative  ROS        Endo/Other  Negative endo/other ROS      GYN  Negative gynecology ROS          Hematology  Anemia acute blood loss anemia,     Musculoskeletal  Back pain , lumbar pain,        Neurology  Negative neurology ROS      Psychology   Negative psychology ROS              Physical Exam    Airway    Mallampati score: III  TM Distance: >3 FB  Neck ROM: full     Dental   No notable dental hx     Cardiovascular  Rhythm: irregular, Rate: abnormal,     Pulmonary  Pulmonary exam normal     Other Findings        Anesthesia Plan  ASA Score- 3 Emergent    Anesthesia Type- IV sedation with anesthesia with ASA Monitors  Additional Monitors:   Airway Plan:         Plan Factors-    Induction-     Postoperative Plan-     Informed Consent- Anesthetic plan and risks discussed with patient

## 2018-02-13 NOTE — OP NOTE
**** GI/ENDOSCOPY REPORT ****     PATIENT NAME: Rob Bartholomew ------ VISIT ID:  Patient ID:   [id number] YOB: 1960     INTRODUCTION: Colonoscopy - A 61 male patient presents for an inpatient   Colonoscopy at [address] [place] [address]  PREVIOUS COLONOSCOPY: No prior colonoscopy  INDICATIONS: Rectal bleeding  CONSENT:  The benefits, risks, and alternatives to the procedure were   discussed and informed consent was obtained from the patient  PREPARATION: EKG, pulse, pulse oximetry and blood pressure were monitored   throughout the procedure  The patient was identified by myself both   verbally and by visual inspection of ID band  Airway Assessment   Classification: Airway class 2 - Visualization of the soft palate, fauces   and uvula  ASA Classification: Class 2 - Patient has mild to moderate   systemic disturbance that may or may not be related to the disorder   requiring surgery  MEDICATIONS: Anesthesia-check records Anesthesia administered by   anesthesiologist      PROCEDURE:  The endoscope was passed with ease through the anus under   direct visualization and advanced to the terminal ileum, confirmed by   appendiceal orifice, cecal strap (crow's foot), and ileocecal valve  The   scope was withdrawn and the mucosa was carefully examined  The quality of   the preparation was fair  Cecal Intubation Time: 5 minutes(s) Scope   Withdrawal Time: 8 minutes(s)     RECTAL EXAM: Normal rectal exam  Normal sphincter tone  FINDINGS:  There was evidence of moderately severe diverticulosis in the   descending colon, sigmoid colon, and rectosigmoid junction  A single   polyp, measuring between 5 and 10 mm in size, was found in the sigmoid   colon  On retroflexed view, small internal hemorrhoids were found  Otherwise, the colon appeared to be normal  TI appeared normal in the   distal 10cm and contained bile  COMPLICATIONS: There were no complications       IMPRESSIONS: Moderately severe diverticulosis found in the descending   colon, sigmoid colon, and rectosigmoid junction with small amounts of old   clotted blood  A single polyp found in the sigmoid colon  Internal   hemorrhoids  TI appeared normal in the distal 10cm and contained bile  There was bile in the right side of the colon, I suspect the current   episode was bleeding was due to self limited diverticular bleed  RECOMMENDATIONS: Colonoscopy recommended in 6 months for polyp removal,   polyp was not removed today due to use of Xarelto yesterday  Return to   floor  Start high fiber diet  No need for EGD as source of bleeding was   most likely diverticular  Monitor H/H and resume Xarelto if indicated  Stop of the use of NSAIDs  ESTIMATED BLOOD LOSS: Insignificant  PATHOLOGY SPECIMENS: No     PROCEDURE CODES: Colonoscopy     ICD-9 Codes: 569 3 Hemorrhage of rectum and anus 562 10 Diverticulosis of   colon (without mention of hemorrhage) 211 3 Benign neoplasm of colon     ICD-10 Codes: K62 5 Hemorrhage of anus and rectum K57 Diverticular disease   of intestine K63 5 Polyp of colon K64 9 Unspecified hemorrhoids     PERFORMED BY: SHERI Cuenca  on 02/13/2018  Version 1, electronically signed by SHERI Dominguez  on 02/13/2018   at 14:33

## 2018-02-13 NOTE — PROGRESS NOTES
[facility] Progress Note  Patient: Ness Decker 61 y o  male   MRN: [id number]  PCP: No primary care provider on file  Unit/Bed#: ED 20 Encounter: [id number]  Date Of Visit: 18    Assessment:    Active Problems:    Acute blood loss anemia    Rectal bleeding      Please refer to AP history and PE    Plan:     Likely diverticular bleed-follow hct/hgb-transfused 1 u prbc so far; hold xarelto-given reversal in ER; for endoscopy tomorrow        Vitals:   Temp (24hrs), Av 1 °F (36 7 °C), Min:97 7 °F (36 5 °C), Max:98 5 °F (36 9 °C)    HR:  [] 131  Resp:  [17-20] 18  BP: (101-134)/(40-71) 102/71  SpO2:  [87 %-98 %] 97 %  Body mass index is 38 8 kg/m²  Input and Output Summary (last 24 hours):         Physical Exam:     Physical Exam   Constitutional: He appears well-developed and well-nourished  HENT:   Head: Normocephalic and atraumatic  Eyes: EOM are normal  Pupils are equal, round, and reactive to light  Cardiovascular: Normal rate and regular rhythm  Pulmonary/Chest: Effort normal and breath sounds normal    Abdominal: Soft  Bowel sounds are normal    Musculoskeletal: He exhibits no edema  Additional Data:     Labs:      Results from last 7 days  Lab Units 18  1923 18  1233   WBC Thousand/uL  --  16 81*   HEMOGLOBIN g/dL 8 9* 9 0*   HEMATOCRIT % 27 4* 27 8*   PLATELETS Thousands/uL  --  175   NEUTROS PCT %  --  81*   LYMPHS PCT %  --  11*   MONOS PCT %  --  7   EOS PCT %  --  0       Results from last 7 days  Lab Units 18  1233   SODIUM mmol/L 139   POTASSIUM mmol/L 4 4   CHLORIDE mmol/L 104   CO2 mmol/L 27   BUN mg/dL 36*   CREATININE mg/dL 1 55*   CALCIUM mg/dL 8 4   TOTAL PROTEIN g/dL 6 0*   BILIRUBIN TOTAL mg/dL 0 30   ALK PHOS U/L 62   ALT U/L 37   AST U/L 15   GLUCOSE RANDOM mg/dL 125       Results from last 7 days  Lab Units 18  1233   INR  2 80*       * I Have Reviewed All Lab Data Listed Above    * Additional Pertinent Lab Tests Reviewed: All Labs Within Last 24 Hours Reviewed    Imaging:    Imaging Reports Reviewed Today Include:   Imaging Personally Reviewed by Myself Includes:      Recent Cultures (last 7 days):           Last 24 Hours Medication List:     Current Facility-Administered Medications:  bisacodyl 10 mg Oral Once Nishi Huerta PA-C   lactated ringers 1,000 mL Intravenous Once Jade Jaime DO   pantoprazole 40 mg Intravenous Q12H Jade Varghese MD   polyethylene glycol 4,000 mL Oral Once Annabelle Hamilton PA-C        Today, Patient Was Seen By: Angel Reyes MD    ** Please Note: Dragon 360 Dictation voice to text software may have been used in the creation of this document   **

## 2018-02-13 NOTE — ASSESSMENT & PLAN NOTE
· Patient with BRBPR  · GI following, recommending IV fluid resuscitation, bowel prep for anticipated colonoscopy tomorrow with possible EGD depending on colonoscopy results    · Hold Xarelto  · Continue clear liquid diet with NPO after midnight  · Protonix 40 mg b i d   · Patient's hemoglobin 9 on admission, receiving 2 units packed red blood cells with hemoglobin stable at 8 9, will order another unit of blood  · Telemetry  · Closely monitor hemoglobin every 4 hours

## 2018-02-13 NOTE — SOCIAL WORK
CM met with pt, wife, and daughter at bedside  Pt is visiting here from [place] is on a business trip and staying at [facility]  He normally lives in a one story house with 4 steps w/railing to enter the residence  Pt has no problem navigating steps and is independent with ADL's  He uses no DME's  He has never been in rehab or used [facility] services  He used OP/PT 3 years ago for a back injury  Denies substance abuse or mental health issues  He does not have a POA or Advanced Directive  CM supplied info on both  He uses [employer] and has no problem with his co-pays  His PCP is Dr Gabriel Quevedo  Denies pain at the present time  He does work and he does drive  His wife will transport home to [place] when medically cleared  No needs identified  CM reviewed discharge planning process including the following: identifying help at home, patient preference for discharge planning needs, pharmacy preference, and availability of treatment team to discuss questions or concerns patient and/or family may have regarding understanding medications and recognizing signs and symptoms once discharged  CM also encouraged patient to follow up with all recommended appointments after discharge  Patient advised of importance for patient and family to participate in managing patients medical well being

## 2018-02-13 NOTE — ASSESSMENT & PLAN NOTE
· Patient with tachycardia on exam, in the 120s to 130s, mild improvement after dose of digoxin  · Likely related to acute illness  · Patient with some soft BP here, add digoxin 250 mcg 1 dose, with improvement  · Continue digoxin 250 mcg Q 6 hours with Cardiology follow-up tomorrow  · Telemetry  · Patient asymptomatic at this time

## 2018-02-13 NOTE — H&P
H&P- Lorella Delay 1960, 61 y o  male MRN: [id number]    Unit/Bed#: -01 Encounter: [id number]    Primary Care Provider: No primary care provider on file  Date and time admitted to hospital: 2/12/2018 12:05 PM       DOS: 2/12/2018      * Acute blood loss anemia   Assessment & Plan    · Patient with BRBPR  · GI following, recommending IV fluid resuscitation, bowel prep for anticipated colonoscopy tomorrow with possible EGD depending on colonoscopy results  · Hold Xarelto  · Continue clear liquid diet with NPO after midnight  · Protonix 40 mg b i d   · Patient's hemoglobin 9 on admission, receiving 2 units packed red blood cells with hemoglobin stable at 8 9, will order another 1 unit of blood due to patient actively bleeding  · Telemetry  · Closely monitor hemoglobin every 4 hours        CAYETANO (acute kidney injury) ([facility] [address] )   Assessment & Plan    · Creatinine 1 55  · Baseline unknown, likely a KI due to volume depletion  · Will give IV fluids 75 milligrams/hour  · Assess with CMP tomorrow        Hypertension   Assessment & Plan    · Most recent /80  · Continue digoxin  · Will add p r n   Hydralazine  · Patient's diltiazem on hold due to previous low blood pressures, continue to hold in appreciate Cardiology recommendations tomorrow        Rapid atrial fibrillation [facility])   Assessment & Plan    · Patient with tachycardia on exam, in the 120s to 130s, mild improvement after dose of digoxin  · Likely related to acute illness  · Patient with some soft BP here, add digoxin 250 mcg 1 dose, patient with improvement, continue to monitor   · Telemetry  · Patient asymptomatic at this time        Rectal bleeding   Assessment & Plan    · Plan as above          VTE Prophylaxis: Pharmacologic VTE Prophylaxis contraindicated due to GI bleed  / sequential compression device   Code Status:  Level 1-full code, discussed with patient at bedside  POLST: There is no POLST form on file for this patient (pre-hospital)  Discussion with family:  Discussed with patient at bedside    Anticipated Length of Stay:  Patient will be admitted on an Inpatient basis with an anticipated length of stay of  > 2 midnights  Justification for Hospital Stay:  Monitoring hemoglobin, colonoscopy/EGD, blood transfusion    Total Time for Visit, including Counseling / Coordination of Care: 45 minutes  Greater than 50% of this total time spent on direct patient counseling and coordination of care  Chief Complaint:   "I was so weak "    History of Present Illness:    Davey Page is a 61 y o  male with significant past medical history of hypertension and AFib who presents with rectal bleeding x1 day  Patient reports that his wife and family were recently sick with the flu and he began to have symptoms of congestion and sore throat starting on Tuesday  Reports that he went to his family doctor on Wednesday and was given prophylactic Tamiflu  Reports that his last day was today  He states that his symptoms began to improve on the Tamiflu  He reports that today he was at a conference and felt the need to defecate  He reports noticing bright red blood after wiping  He states that he did not think much of it and went back to the conference  However throughout the day the patient became very weak and short of breath with ambulation  He states that he was so weak that they had wheelchair him to his room  He reports that he had about 6-7 episodes of bloody diarrhea today  Denies any previous history of this  He denies abdominal pain, nausea and vomiting  Patient also reported that he felt lightheaded at that time  He reports now that after he has gotten a few units of blood that his weakness has improved  Patient is on Xarelto for management in Afib  Patient also reports history of asthma  Review of Systems:    Review of Systems   Constitutional: Negative for chills, diaphoresis and fever     HENT: Negative for congestion, ear pain, hearing loss, rhinorrhea, sinus pressure, sore throat and tinnitus  Eyes: Negative for pain, redness and visual disturbance  Respiratory: Positive for shortness of breath  Negative for cough and chest tightness  Cardiovascular: Negative for chest pain and leg swelling  Gastrointestinal: Positive for blood in stool, diarrhea and nausea  Negative for abdominal pain, constipation and vomiting  Genitourinary: Negative for difficulty urinating, dysuria and hematuria  Musculoskeletal: Negative for joint swelling and myalgias  Skin: Negative for color change, pallor and rash  Neurological: Positive for light-headedness  Negative for dizziness and headaches  Past Medical and Surgical History:     Past Medical History:   Diagnosis Date    [facility]     Asthma     Hypertension        Past Surgical History:   Procedure Laterality Date    JOINT REPLACEMENT      ROTATOR CUFF REPAIR         Meds/Allergies:    Prior to Admission medications    Medication Sig Start Date End Date Taking?  Authorizing Provider   albuterol (PROVENTIL HFA,VENTOLIN HFA) 90 mcg/act inhaler Inhale 2 puffs every 6 (six) hours as needed for wheezing   Yes Historical Provider, MD   diclofenac (VOLTAREN) 75 mg EC tablet Take 50 mg by mouth 2 (two) times a day   Yes Historical Provider, MD   diltiazem (DILACOR XR) 120 MG 24 hr capsule Take 120 mg by mouth daily   Yes Historical Provider, MD   fluticasone (FLOVENT HFA) 220 mcg/act inhaler Inhale 1 puff 2 (two) times a day   Yes Historical Provider, MD   HYDROcodone-acetaminophen (NORCO)  mg per tablet Take 1 tablet by mouth every 6 (six) hours as needed for moderate pain   Yes Historical Provider, MD   losartan (COZAAR) 25 mg tablet Take 25 mg by mouth daily   Yes Historical Provider, MD   mometasone (NASONEX) 50 mcg/act nasal spray 2 sprays into each nostril daily   Yes Historical Provider, MD   montelukast (SINGULAIR) 10 mg tablet Take 10 mg by mouth daily at bedtime   Yes Historical Provider, MD   oseltamivir (TAMIFLU) 75 mg capsule Take 75 mg by mouth every 12 (twelve) hours   Yes Historical Provider, MD   pantoprazole (PROTONIX) 40 mg tablet Take 40 mg by mouth daily   Yes Historical Provider, MD   rivaroxaban (XARELTO) 20 mg tablet Take 20 mg by mouth   Yes Historical Provider, MD     I have reviewed home medications with patient personally  Allergies: Allergies   Allergen Reactions    Penicillins Hives       Social History:     Marital Status: /Civil Union   Occupation:   Patient Pre-hospital Living Situation:  Patient lives at home with his wife and son  Patient Pre-hospital Level of Mobility:  Full mobility  Patient Pre-hospital Diet Restrictions:  None  Substance Use History:   History   Alcohol Use No     History   Smoking Status    Never Smoker   Smokeless Tobacco    Never Used     History   Drug Use No       Family History:    non-contributory    Physical Exam:     Vitals:   Blood Pressure: 148/80 (02/12/18 2126)  Pulse: (!) 110 (02/12/18 2126)  Temperature: 98 °F (36 7 °C) (02/12/18 2126)  Temp Source: Oral (02/12/18 2126)  Respirations: 17 (02/12/18 2126)  Height: 5' 6" (167 6 cm) (02/12/18 2126)  Weight - Scale: 109 kg (240 lb 6 4 oz) (02/12/18 2126)  SpO2: 95 % (02/12/18 2126)    Physical Exam   Constitutional: No distress  Patient is in no acute distress sitting up in his hospital bed   HENT:   Head: Normocephalic and atraumatic  Eyes: Conjunctivae are normal    Cardiovascular: Normal rate and intact distal pulses  No murmur heard  Irregular rhythm   Pulmonary/Chest: Effort normal and breath sounds normal  No respiratory distress  He has no wheezes  He has no rales  Abdominal: Soft  Bowel sounds are normal  He exhibits no distension  There is no tenderness  There is no rebound  Musculoskeletal: He exhibits no edema  Neurological: He is alert  Face symmetrical   Patient follows simple commands  Skin: Skin is warm and dry  He is not diaphoretic  No erythema  Psychiatric: He has a normal mood and affect  Vitals reviewed  Additional Data:     Lab Results: I have personally reviewed pertinent reports  Results from last 7 days  Lab Units 02/12/18  1923 02/12/18  1233   WBC Thousand/uL  --  16 81*   HEMOGLOBIN g/dL 8 9* 9 0*   HEMATOCRIT % 27 4* 27 8*   PLATELETS Thousands/uL  --  175   NEUTROS PCT %  --  81*   LYMPHS PCT %  --  11*   MONOS PCT %  --  7   EOS PCT %  --  0       Results from last 7 days  Lab Units 02/12/18  1233   SODIUM mmol/L 139   POTASSIUM mmol/L 4 4   CHLORIDE mmol/L 104   CO2 mmol/L 27   BUN mg/dL 36*   CREATININE mg/dL 1 55*   CALCIUM mg/dL 8 4   TOTAL PROTEIN g/dL 6 0*   BILIRUBIN TOTAL mg/dL 0 30   ALK PHOS U/L 62   ALT U/L 37   AST U/L 15   GLUCOSE RANDOM mg/dL 125       Results from last 7 days  Lab Units 02/12/18  1233   INR  2 80*       Imaging: I have personally reviewed pertinent reports  X-ray chest 2 views   ED Interpretation by Alannah Hdz DO (02/12 1614)   No acute abnormality in the chest       Final Result by Sloane Pritchard MD (02/12 1620)      No active pulmonary disease  Workstation performed: [id number]         CT abdomen pelvis with contrast   Final Result by Sancho Gonsalez MD (02/12 1612)      1  Colonic diverticulosis without acute diverticulitis  Scattered air-fluid levels in the colon, nonspecific but may indicate underlying diarrhea  Correlate clinically  2   1 4 cm indeterminant heterogeneous left adrenal nodule with scattered calcifications  Although its imaging features are indeterminate, it does demonstrate some suspicious features such as heterogeneity and calcifications, therefore recommend adrenal    mass protocol CT or MRI for further characterization  Recommendation based on institutional consensus and [address] of Radiology 2010;7:754-773   3  Small fat-containing left inguinal hernia    Moderate-sized fat-containing periumbilical hernia  4   Bilateral spondylolysis defects at L5 with grade 1-2 anterolisthesis of L5 on S1  Mild to moderate posterior wedging of L5 may be degenerative  Workstation performed: [id number]             EKG, Pathology, and Other Studies Reviewed on Admission:   · Chest x-ray and CT results reviewed    Allscripts / Epic Records Reviewed: Yes     ** Please Note: This note has been constructed using a voice recognition system   **

## 2018-02-13 NOTE — CONSULTS
History and Physical - Critical Care   Robel Liu 61 y o  male MRN: [id number]  Unit/Bed#: -01 Encounter: [id number]    Reason for Admission / Chief Complaint: GI Bleed/ Afib RVR    History of Present Illness:  Robel Liu is a 61 y o  male who presented to [facility] ED for evaluation for rectal bleeding with associated lower abdominal cramping  Patient has a past medical history of Xarelto, hypertension, GERD, and chronic back pain  Per documentation patient reported acute onset lower abdominal crampy pain in lower abdomen with associated bloody diarrhea  He described admixture of bright red blood mix throughout stool  He continued to have multiple episodes throughout day prompting ED evaluation  Patient denied history if GI bleed or hemorrhoids  Upon arrival patient was noted to have Hgb 9 0 with AFib with RVR and 120s to 130s  Reported associated lightheadedness and weakness  CT did not reveal localized source of bleeding  Patient received 2 L of crystalloid fluid resuscitation as well as 1 unit of packed red blood cells  He remained hemodynamically stable without additional episodes bright red blood per rectum  He subsequently admitted to the general medicine floor  Over the course the evening he received bowel prep for tentative colonoscopy today  He  remained in AFib with RVR into the 120s  He was placed on IV fluids at 75 mL an hour  Patient received received 1 time dose of digoxin for rapid heart rate in setting of soft systolic blood pressures  Upon evaluation he was found to be in AFib with heart rates in the 120s  Follow   Patient asymptomatic  Denies lightheaded dizziness, chest pain or difficulties breathing  Hemoglobins have been relatively stable with slight downtrend  He will be subsequently transferred to step-down unit for closer hemodynamic monitoring      History obtained from the patient/chart    Past Medical History:  Past Medical History:   Diagnosis Date    A-fib ([facility] [address] )     Asthma     Hypertension        Past Surgical History:  Past Surgical History:   Procedure Laterality Date    JOINT REPLACEMENT      ROTATOR CUFF REPAIR         Past Family History:  Family History   Problem Relation Age of Onset    Heart disease Father     Heart disease Brother     Asthma Brother        Medications:  Current Facility-Administered Medications   Medication Dose Route Frequency    acetaminophen (TYLENOL) tablet 650 mg  650 mg Oral Q6H PRN    albuterol (PROVENTIL HFA,VENTOLIN HFA) inhaler 2 puff  2 puff Inhalation Q6H PRN    digoxin (LANOXIN) injection 250 mcg  250 mcg Intravenous Once    digoxin (LANOXIN) injection 250 mcg  250 mcg Intravenous Once    diltiazem (CARDIZEM CD) 24 hr capsule 120 mg  120 mg Oral Daily    fluticasone (FLOVENT HFA) 220 mcg/act inhaler 1 puff  1 puff Inhalation BID    hydrALAZINE (APRESOLINE) injection 5 mg  5 mg Intravenous Q6H PRN    HYDROcodone-acetaminophen (NORCO) 5-325 mg per tablet 1 tablet  1 tablet Oral Q6H PRN    metoprolol (LOPRESSOR) injection 5 mg  5 mg Intravenous Q6H PRN    montelukast (SINGULAIR) tablet 10 mg  10 mg Oral HS    pantoprazole (PROTONIX) injection 40 mg  40 mg Intravenous Q12H    prochlorperazine (COMPAZINE) tablet 5 mg  5 mg Oral Q6H PRN    sodium chloride 0 9 % bolus 500 mL  500 mL Intravenous Once    sodium chloride 0 9 % infusion  100 mL/hr Intravenous Continuous     Home medications:  Prior to Admission medications    Medication Sig Start Date End Date Taking?  Authorizing Provider   albuterol (PROVENTIL HFA,VENTOLIN HFA) 90 mcg/act inhaler Inhale 2 puffs every 6 (six) hours as needed for wheezing   Yes Historical Provider, MD   diclofenac (VOLTAREN) 75 mg EC tablet Take 50 mg by mouth 2 (two) times a day   Yes Historical Provider, MD   diltiazem (DILACOR XR) 120 MG 24 hr capsule Take 120 mg by mouth daily   Yes Historical Provider, MD   fluticasone (FLOVENT HFA) 220 mcg/act inhaler Inhale 1 puff 2 (two) times a day   Yes Historical Provider, MD   HYDROcodone-acetaminophen (NORCO)  mg per tablet Take 1 tablet by mouth every 6 (six) hours as needed for moderate pain   Yes Historical Provider, MD   losartan (COZAAR) 25 mg tablet Take 25 mg by mouth daily   Yes Historical Provider, MD   mometasone (NASONEX) 50 mcg/act nasal spray 2 sprays into each nostril daily   Yes Historical Provider, MD   montelukast (SINGULAIR) 10 mg tablet Take 10 mg by mouth daily at bedtime   Yes Historical Provider, MD   oseltamivir (TAMIFLU) 75 mg capsule Take 75 mg by mouth every 12 (twelve) hours   Yes Historical Provider, MD   pantoprazole (PROTONIX) 40 mg tablet Take 40 mg by mouth daily   Yes Historical Provider, MD   rivaroxaban (XARELTO) 20 mg tablet Take 20 mg by mouth   Yes Historical Provider, MD     Allergies: Allergies   Allergen Reactions    Penicillins Hives       ROS:   Review of Systems   Constitutional: Negative  HENT: Negative  Respiratory: Negative  Cardiovascular: Negative  Gastrointestinal: Positive for anal bleeding and blood in stool  Musculoskeletal: Negative  Skin: Negative  Neurological: Negative  Vitals:  Vitals:    18 2130 18 0100 18 0500 18 0744   BP: 148/80 121/89 139/98 117/80   BP Location:  Left arm  Right arm   Pulse: (!) 110 (!) 122 (!) 141 (!) 150   Resp:  18  18   Temp: 98 °F (36 7 °C) 98 2 °F (36 8 °C)  98 5 °F (36 9 °C)   TempSrc:  Oral  Oral   SpO2:  95%  96%   Weight:       Height:         Temperature:   Temp (24hrs), Av 1 °F (36 7 °C), Min:97 7 °F (36 5 °C), Max:98 5 °F (36 9 °C)    Current Temperature: 98 5 °F (36 9 °C)    Weights:   IBW: 63 8 kg  Body mass index is 38 8 kg/m²  Physical Exam:  Physical Exam   Constitutional: He is oriented to person, place, and time  He appears well-developed and well-nourished  No distress  HENT:   Head: Normocephalic and atraumatic     Eyes: EOM are normal  Pupils are equal, round, and reactive to light  Neck: Normal range of motion  Neck supple  Cardiovascular: Normal rate  Exam reveals no gallop and no friction rub  No murmur heard  Irregularly regular   Pulmonary/Chest: Effort normal and breath sounds normal  No respiratory distress  Abdominal: Soft  Bowel sounds are normal  There is no tenderness  There is no rebound and no guarding  Musculoskeletal: Normal range of motion  He exhibits no edema or tenderness  Neurological: He is alert and oriented to person, place, and time  No cranial nerve deficit  Coordination normal    Skin: Skin is warm and dry  Vitals reviewed  Labs:    Results from last 7 days  Lab Units 02/13/18  0704 02/12/18  2253 02/12/18  1923 02/12/18  1233   WBC Thousand/uL 18 41*  --   --  16 81*   HEMOGLOBIN g/dL 8 5* 9 9* 8 9* 9 0*   HEMATOCRIT % 25 0*  --  27 4* 27 8*   PLATELETS Thousands/uL 130*  --   --  175   NEUTROS PCT %  --   --   --  81*   MONOS PCT %  --   --   --  7      Results from last 7 days  Lab Units 02/13/18  0704 02/12/18  1233   SODIUM mmol/L 140 139   POTASSIUM mmol/L 4 4 4 4   CHLORIDE mmol/L 106 104   CO2 mmol/L 26 27   BUN mg/dL 27* 36*   CREATININE mg/dL 1 04 1 55*   CALCIUM mg/dL 7 9* 8 4   TOTAL PROTEIN g/dL 4 9* 6 0*   BILIRUBIN TOTAL mg/dL 0 70 0 30   ALK PHOS U/L 45* 62   ALT U/L 27 37   AST U/L 15 15   GLUCOSE RANDOM mg/dL 131 125                Results from last 7 days  Lab Units 02/13/18  0705 02/12/18  1233   INR  1 54* 2 80*   PTT seconds  --  31       Results from last 7 days  Lab Units 02/13/18  0703   LACTIC ACID mmol/L 1 0       0  Lab Value Date/Time   TROPONINI <0 02 02/13/2018 0310   TROPONINI <0 02 02/12/2018 1233       Imaging:  I have personally reviewed pertinent reports  and I have personally reviewed pertinent films in PACS  EKG: This was personally reviewed by myself     Micro:  No results found for: Peterson Diaz, Mami Banda    ______________________________________________________________________    Assessment:   GI bleed likely lower  AFib with RVR  Coagulopathy  Hypertension  Asthma  Plan:     Neuro:    No acute issues  Delirium precautions  Regulate sleep-wake cycles  Daily CAM ICU  CV:    AFib with RVR  Once adequately resuscitated will initiate low-dose Lopressor  25 mg q 6 hours  Lopressor 5 mg IV p r n  Hold all systemic anticoagulation  Hold home antihypertensives   Lung:   Encourage good pulmonary hygiene/incentive spirometry  GI:    Acute GI Bleed; likely a diverticular bleed however chronic NSAID use  Tentative EGD/colonoscopy today  Would recommend decreasing hemoglobins Q 6 to q 8 hours as hemoglobins relatively stable   Discontinue Protonix GTT   FEN:   IV fluid hydration while NPO  Monitor electrolytes replete as warranted  Post procedure will initiate clear liquid diet advance as tolerated  :    Monitor Is&Os daily  BUN/creatinine stable   ID:    No obvious source of infection  Monitor fever curve and WBCs  Heme:    Q 6 hours H&H  If remains stable will check daily   Hold systemic anticoagulation  Endo:    Check glucose on daily BMPs   Msk/Skin:    Frequent offloading repositioning with skin breakdown  Disposition:  Step-down 1    ______________________________________________________________________    VTE Pharmacologic Prophylaxis: Reason for no pharmacologic prophylaxis GI bleed  VTE Mechanical Prophylaxis: sequential compression device    Invasive lines and devices: Invasive Devices     Peripheral Intravenous Line            Peripheral IV 02/12/18 Left Antecubital less than 1 day    Peripheral IV 02/12/18 Right Antecubital less than 1 day                Code Status: Level 1 - Full Code  POA:    POLST:      Given critical illness, patient length of stay will require greater than two midnights      Counseling / Coordination of Care  Total Critical Care time spent 30 minutes excluding procedures, teaching and family updates  Portions of the record may have been created with voice recognition software  Occasional wrong word or "sound a like" substitutions may have occurred due to the inherent limitations of voice recognition software  Read the chart carefully and recognize, using context, where substitutions have occurred        Joseph Crowe PA-C

## 2018-02-14 LAB
ABO GROUP BLD BPU: NORMAL
ALBUMIN SERPL BCP-MCNC: 2.5 G/DL (ref 3.5–5)
ALP SERPL-CCNC: 42 U/L (ref 46–116)
ALT SERPL W P-5'-P-CCNC: 27 U/L (ref 12–78)
ANION GAP SERPL CALCULATED.3IONS-SCNC: 6 MMOL/L (ref 4–13)
AST SERPL W P-5'-P-CCNC: 17 U/L (ref 5–45)
BASOPHILS # BLD AUTO: 0.03 THOUSANDS/ΜL (ref 0–0.1)
BASOPHILS NFR BLD AUTO: 0 % (ref 0–1)
BILIRUB SERPL-MCNC: 0.6 MG/DL (ref 0.2–1)
BPU ID: NORMAL
BUN SERPL-MCNC: 12 MG/DL (ref 5–25)
CALCIUM SERPL-MCNC: 8.2 MG/DL (ref 8.3–10.1)
CHLORIDE SERPL-SCNC: 108 MMOL/L (ref 100–108)
CO2 SERPL-SCNC: 29 MMOL/L (ref 21–32)
CREAT SERPL-MCNC: 0.92 MG/DL (ref 0.6–1.3)
CROSSMATCH: NORMAL
EOSINOPHIL # BLD AUTO: 0.21 THOUSAND/ΜL (ref 0–0.61)
EOSINOPHIL NFR BLD AUTO: 2 % (ref 0–6)
ERYTHROCYTE [DISTWIDTH] IN BLOOD BY AUTOMATED COUNT: 14.5 % (ref 11.6–15.1)
GFR SERPL CREATININE-BSD FRML MDRD: 91 ML/MIN/1.73SQ M
GLUCOSE SERPL-MCNC: 98 MG/DL (ref 65–140)
HCT VFR BLD AUTO: 25.8 % (ref 36.5–49.3)
HCT VFR BLD AUTO: 27.3 % (ref 36.5–49.3)
HGB BLD-MCNC: 8.3 G/DL (ref 12–17)
HGB BLD-MCNC: 8.9 G/DL (ref 12–17)
HGB BLD-MCNC: 9 G/DL (ref 12–17)
LYMPHOCYTES # BLD AUTO: 2.35 THOUSANDS/ΜL (ref 0.6–4.47)
LYMPHOCYTES NFR BLD AUTO: 19 % (ref 14–44)
MAGNESIUM SERPL-MCNC: 2 MG/DL (ref 1.6–2.6)
MCH RBC QN AUTO: 30.5 PG (ref 26.8–34.3)
MCHC RBC AUTO-ENTMCNC: 32.2 G/DL (ref 31.4–37.4)
MCV RBC AUTO: 95 FL (ref 82–98)
MONOCYTES # BLD AUTO: 1.13 THOUSAND/ΜL (ref 0.17–1.22)
MONOCYTES NFR BLD AUTO: 9 % (ref 4–12)
NEUTROPHILS # BLD AUTO: 8.78 THOUSANDS/ΜL (ref 1.85–7.62)
NEUTS SEG NFR BLD AUTO: 69 % (ref 43–75)
NRBC BLD AUTO-RTO: 0 /100 WBCS
PLATELET # BLD AUTO: 123 THOUSANDS/UL (ref 149–390)
PMV BLD AUTO: 10.9 FL (ref 8.9–12.7)
POTASSIUM SERPL-SCNC: 3.9 MMOL/L (ref 3.5–5.3)
PROT SERPL-MCNC: 4.9 G/DL (ref 6.4–8.2)
RBC # BLD AUTO: 2.72 MILLION/UL (ref 3.88–5.62)
SODIUM SERPL-SCNC: 143 MMOL/L (ref 136–145)
UNIT DISPENSE STATUS: NORMAL
UNIT PRODUCT CODE: NORMAL
UNIT RH: NORMAL
WBC # BLD AUTO: 12.66 THOUSAND/UL (ref 4.31–10.16)

## 2018-02-14 PROCEDURE — 80053 COMPREHEN METABOLIC PANEL: CPT | Performed by: INTERNAL MEDICINE

## 2018-02-14 PROCEDURE — 85018 HEMOGLOBIN: CPT | Performed by: NURSE PRACTITIONER

## 2018-02-14 PROCEDURE — 99232 SBSQ HOSP IP/OBS MODERATE 35: CPT | Performed by: INTERNAL MEDICINE

## 2018-02-14 PROCEDURE — 85018 HEMOGLOBIN: CPT | Performed by: PHYSICIAN ASSISTANT

## 2018-02-14 PROCEDURE — 83735 ASSAY OF MAGNESIUM: CPT | Performed by: PHYSICIAN ASSISTANT

## 2018-02-14 PROCEDURE — 85025 COMPLETE CBC W/AUTO DIFF WBC: CPT | Performed by: PHYSICIAN ASSISTANT

## 2018-02-14 PROCEDURE — 85014 HEMATOCRIT: CPT | Performed by: PHYSICIAN ASSISTANT

## 2018-02-14 PROCEDURE — 99232 SBSQ HOSP IP/OBS MODERATE 35: CPT | Performed by: PHYSICIAN ASSISTANT

## 2018-02-14 RX ORDER — METOPROLOL TARTRATE 50 MG/1
50 TABLET, FILM COATED ORAL EVERY 12 HOURS SCHEDULED
Status: DISCONTINUED | OUTPATIENT
Start: 2018-02-14 | End: 2018-02-15 | Stop reason: HOSPADM

## 2018-02-14 RX ADMIN — DILTIAZEM HYDROCHLORIDE 120 MG: 120 CAPSULE, COATED, EXTENDED RELEASE ORAL at 08:45

## 2018-02-14 RX ADMIN — METOPROLOL TARTRATE 25 MG: 25 TABLET ORAL at 07:06

## 2018-02-14 RX ADMIN — HYDROCODONE BITARTRATE AND ACETAMINOPHEN 1 TABLET: 5; 325 TABLET ORAL at 09:40

## 2018-02-14 RX ADMIN — FLUTICASONE PROPIONATE 1 PUFF: 220 AEROSOL, METERED RESPIRATORY (INHALATION) at 10:22

## 2018-02-14 RX ADMIN — METOPROLOL TARTRATE 50 MG: 50 TABLET ORAL at 21:07

## 2018-02-14 RX ADMIN — HYDROCODONE BITARTRATE AND ACETAMINOPHEN 1 TABLET: 5; 325 TABLET ORAL at 19:53

## 2018-02-14 RX ADMIN — MONTELUKAST SODIUM 10 MG: 10 TABLET, FILM COATED ORAL at 21:07

## 2018-02-14 NOTE — PROGRESS NOTES
General Cardiology   Progress Note   Vinnie Spencer 61 y o  male MRN: [id number]  Unit/Bed#:  Encounter: [id number]        Subjective:   No significant events since the last encounter  Heart rate improved, currently 90s  Denies chest pain, shortness of breath  Objective:   Vitals:  Vitals:    02/14/18 1024   BP: 130/58   Pulse: 94   Resp: 18   Temp: 98 2 °F (36 8 °C)   SpO2: 95%       Body mass index is 38 8 kg/m²  Systolic (67ASV), GXB:254 , Min:110 , IYP:279     Diastolic (68GZV), SMI:30, Min:58, Max:79      Intake/Output Summary (Last 24 hours) at 02/14/18 1043  Last data filed at 02/14/18 0402   Gross per 24 hour   Intake              550 ml   Output             3750 ml   Net            -3200 ml     Weight (last 2 days)     Date/Time   Weight    02/12/18 2126  109 (240 4)    02/12/18 1209  109 (240 4)              Telemetry Review:  Atrial fibrillation, HR 90s    PHYSICAL EXAMS:  General:  Patient is not in acute distress, laying in the bed comfortably, awake, alert responding to commands  Head: Normocephalic, Atraumatic  HEENT: White sclera, pink conjunctiva,  PERRLA,pharynx benign  Neck:  Supple, no neck vein distention, carotids+2/+2 no bruits, thyromegaly, adenopathy  Respiratory: clear to P/A  Cardiovascular:  + irregular irregular, PMI normal, S1-S2 normal, No  Murmurs, thrills, gallops, rubs   GI:  Abdomen soft nontender   No hepatosplenomegaly, adenopathy, ascites,or rebound tenderness  Extremities: No edema, normal pulses, no calf tenderness, no joint deformities, no venous disease   Integument:  No skin rashes or ulceration  Lymphatic:  No cervical or inguinal lymphadenopathy  Neurologic:  Patient is awake alert, responding to command, well-oriented to time and place and person moving all extremities      LABORATORY RESULTS:    Results from last 7 days  Lab Units 02/13/18  0310 02/12/18  1233   TROPONIN I ng/mL <0 02 <0 02     CBC with diff:   Results from last 7 days  Lab Units 02/14/18  0447 02/13/18  1836 02/13/18  1058  02/13/18  0704  02/12/18  1233   WBC Thousand/uL 12 66*  --   --   --  18 41*  --  16 81*   HEMOGLOBIN g/dL 8 3* 10 1* 9 3*  < > 8 5*  < > 9 0*   HEMATOCRIT % 25 8* 30 0* 28 1*  < > 25 0*  < > 27 8*   MCV fL 95  --   --   --  90  --  94   PLATELETS Thousands/uL 123*  --   --   --  130*  --  175   MCH pg 30 5  --   --   --  30 7  --  30 4   MCHC g/dL 32 2  --   --   --  34 0  --  32 4   RDW % 14 5  --   --   --  14 2  --  13 2   MPV fL 10 9  --   --   --  11 5  --  11 3   NRBC AUTO /100 WBCs 0  --   --   --   --   --  0   < > = values in this interval not displayed  CMP:  Results from last 7 days  Lab Units 02/14/18 0447 02/13/18  0704 02/12/18  1233   SODIUM mmol/L 143 140 139   POTASSIUM mmol/L 3 9 4 4 4 4   CHLORIDE mmol/L 108 106 104   CO2 mmol/L 29 26 27   ANION GAP mmol/L 6 8 8   BUN mg/dL 12 27* 36*   CREATININE mg/dL 0 92 1 04 1 55*   GLUCOSE RANDOM mg/dL 98 131 125   CALCIUM mg/dL 8 2* 7 9* 8 4   AST U/L 17 15 15   ALT U/L 27 27 37   ALK PHOS U/L 42* 45* 62   TOTAL PROTEIN g/dL 4 9* 4 9* 6 0*   BILIRUBIN TOTAL mg/dL 0 60 0 70 0 30   EGFR ml/min/1 73sq m 91 79 49       BMP:  Results from last 7 days  Lab Units 02/14/18 0447 02/13/18  0704 02/12/18  1233   SODIUM mmol/L 143 140 139   POTASSIUM mmol/L 3 9 4 4 4 4   CHLORIDE mmol/L 108 106 104   CO2 mmol/L 29 26 27   BUN mg/dL 12 27* 36*   CREATININE mg/dL 0 92 1 04 1 55*   GLUCOSE RANDOM mg/dL 98 131 125   CALCIUM mg/dL 8 2* 7 9* 8 4                Results from last 7 days  Lab Units 02/14/18  0447   MAGNESIUM mg/dL 2 0               Results from last 7 days  Lab Units 02/13/18  0705 02/12/18  1233   INR  1 54* 2 80*       Lipid Profile:   No results found for: CHOL  No results found for: HDL  No results found for: LDLCALC  No results found for: TRIG    Cardiac testing:   No results found for this or any previous visit  No results found for this or any previous visit    No results found for this or any previous visit  No procedure found  No results found for this or any previous visit  Meds/Allergies   all current active meds have been reviewed and current meds:   Current Facility-Administered Medications   Medication Dose Route Frequency    acetaminophen (TYLENOL) tablet 650 mg  650 mg Oral Q6H PRN    albuterol (PROVENTIL HFA,VENTOLIN HFA) inhaler 2 puff  2 puff Inhalation Q6H PRN    diltiazem (CARDIZEM CD) 24 hr capsule 120 mg  120 mg Oral Daily    fluticasone (FLOVENT HFA) 220 mcg/act inhaler 1 puff  1 puff Inhalation BID    HYDROcodone-acetaminophen (NORCO) 5-325 mg per tablet 1 tablet  1 tablet Oral Q6H PRN    metoprolol (LOPRESSOR) injection 5 mg  5 mg Intravenous Q6H PRN    metoprolol tartrate (LOPRESSOR) tablet 50 mg  50 mg Oral Q12H YARI    montelukast (SINGULAIR) tablet 10 mg  10 mg Oral HS    prochlorperazine (COMPAZINE) tablet 5 mg  5 mg Oral Q6H PRN    sodium chloride 0 9 % bolus 500 mL  500 mL Intravenous Q4H PRN     Prescriptions Prior to Admission   Medication    albuterol (PROVENTIL HFA,VENTOLIN HFA) 90 mcg/act inhaler    diclofenac (VOLTAREN) 75 mg EC tablet    diltiazem (DILACOR XR) 120 MG 24 hr capsule    fluticasone (FLOVENT HFA) 220 mcg/act inhaler    HYDROcodone-acetaminophen (NORCO)  mg per tablet    losartan (COZAAR) 25 mg tablet    mometasone (NASONEX) 50 mcg/act nasal spray    montelukast (SINGULAIR) 10 mg tablet    oseltamivir (TAMIFLU) 75 mg capsule    pantoprazole (PROTONIX) 40 mg tablet    rivaroxaban (XARELTO) 20 mg tablet       Assessment/Plan:  1  Atrial fibrillation with RVR:   HR significantly improved, now 90s  Will increase Lopressor to 50 mg b i d  Advised patient to resume Xarelto in a few days to 1 week given previous bleeding episode  Last recorded hemoglobin 8 9  Patient states he will follow up with his cardiologist in [place]      2  Hypertension:   Last recorded /58    Will increase Lopressor to 50 mg b i d   Continue present regimen  ** Please Note: Dragon 360 Dictation voice to text software may have been used in the creation of this document   **

## 2018-02-14 NOTE — PROGRESS NOTES
Progress Note - Critical Care   Maria Victoria Wallis 61 y o  male MRN: [id number]  Unit/Bed#:  Encounter: [id number]    Assessment:   1  Lower GI bleed secondary diverticulosis   2  Atrial fibrillation on chronic systemic anticoagulation   3  HTN  4  Asthma     Plan:      Neuro:   -sleep hygiene  -CAM ICU   CV:   -patient has been rate controlled   -continue on BB/digoxin/cardizem  -restart systemic anticoagulation today  -cardiology following, appreciate recommendations   Lung:   -early mobilization  -incentive spirometry    GI:   -lower GI bleed was likely secondary to self limiting diverticular bleed, no active bleeding noted on colonoscopy  -discontinue NSAID use  -advance diet today   -6 month follow up colonoscopy    FEN:   -monitor electrolytes and replete as necessary    :   -monitor urine output   ID:   -no infectious etiology  -monitor temperature curve and WBCs   Heme:   -h/h stable   -restart systemic anticoagulation    Endo:   -monitor glucose via BMP    Msk/Skin:   -OOB   Disposition:   -return to ms/tele     ______________________________________________________________________    HPI/24hr events: colonoscopy yesterday showing self limiting diverticular bleed, h/h stable overnight     ______________________________________________________________________    Physical Exam:   PHYSICAL EXAM  General :   Well developed, well nourished, age appropriate affect, behavior, orientation, thought content, thought processes, judgement, and insight  Articulate  English primary language  Neuro:   GCS=   CN 2-12 intact  Non Focal  HEENT:  Normocephalic, atraumatic, Pupils 3 brisk bilat  PERRLA, EOMI, hearing grossly intact  Symmetrical facial expressions without droop or slurred speech  Tongue midline without fasiculations  Neck:  No JVD, FROM, No masses/adenopathy  Back:   Symmetrical, atruamatic, spinous process pain free on palpation  Cardiovascular:   No heaves,lifts,thrills   S1/S2 irregularly irregular 100 bpm No noted MRGC  Pulmonary:   Symmetrical expansion of chest  Resp even & unlabored  GI :   Abd SNT + BSX4 quads  No palp/pulsitile masses  :  N/A  Musculoskeletal:  Symmetrical  No obvious deformity  FROM in UE & LE  Muscle strength 5/5  No lymphadenopathy  Extrem warm to touch  DTR grossly intact  Brachial/radial/femoral/popliteal/pedal/posterior tibial pulses +2  No lesions noted  CN 2-12 grossly intact  No rhomberg  Sensation and motor function intact   ______________________________________________________________________  Vitals:    18 1953 18 2314 18 0300 18 0402   BP:  110/69 132/60 127/58   BP Location:  Right arm Left arm Right arm   Pulse:  93  98   Resp:  (!)    Temp: 98 5 °F (36 9 °C) 98 4 °F (36 9 °C)  98 6 °F (37 °C)   TempSrc: Oral Oral  Temporal   SpO2:  94% 96% 95%   Weight:       Height:         Temperature:   Temp (24hrs), Av 5 °F (36 9 °C), Min:98 °F (36 7 °C), Max:98 9 °F (37 2 °C)    Current Temperature: 98 6 °F (37 °C)  Weights:   IBW: 63 8 kg    Body mass index is 38 8 kg/m²    Weight (last 2 days)     Date/Time   Weight    18 2126  109 (240 4)    18 1209  109 (240 4)            Hemodynamic Monitoring:  N/A     Non-Invasive/Invasive Ventilation Settings:  Respiratory    Lab Data (Last 4 hours)    None         O2/Vent Data (Last 4 hours)    None              No results found for: PHART, FUC5PKP, PO2ART, ZSH2WTJ, G9FPDZOW, BEART, SOURCE  SpO2: SpO2: 95 %  Intake and Outputs:  I/O        07 -  0700  07 -  0700    I V  (mL/kg)  200 (1 8)    Blood 735 350    IV Piggyback 800     Total Intake(mL/kg) 1535 (14 1) 550 (5)    Urine (mL/kg/hr)  4150 (1 6)    Stool 400 200 (0 1)    Total Output 400 4350    Net +1132 -9430              Nutrition:        Diet Orders            Start     Ordered    18 1555  Diet Clear Liquid  Diet effective now     Question Answer Comment   Diet Type Clear Liquid    RD to adjust diet per protocol? Yes        02/13/18 1555        Labs:     Results from last 7 days  Lab Units 02/13/18  1836 02/13/18  1058 02/13/18  0812 02/13/18  0704  02/12/18  1233   WBC Thousand/uL  --   --   --  18 41*  --  16 81*   HEMOGLOBIN g/dL 10 1* 9 3* 8 3* 8 5*  < > 9 0*   HEMATOCRIT % 30 0* 28 1* 24 5* 25 0*  < > 27 8*   PLATELETS Thousands/uL  --   --   --  130*  --  175   NEUTROS PCT %  --   --   --   --   --  81*   MONOS PCT %  --   --   --   --   --  7   < > = values in this interval not displayed  Results from last 7 days  Lab Units 02/13/18  0704 02/12/18  1233   SODIUM mmol/L 140 139   POTASSIUM mmol/L 4 4 4 4   CHLORIDE mmol/L 106 104   CO2 mmol/L 26 27   BUN mg/dL 27* 36*   CREATININE mg/dL 1 04 1 55*   CALCIUM mg/dL 7 9* 8 4   TOTAL PROTEIN g/dL 4 9* 6 0*   BILIRUBIN TOTAL mg/dL 0 70 0 30   ALK PHOS U/L 45* 62   ALT U/L 27 37   AST U/L 15 15   GLUCOSE RANDOM mg/dL 131 125                Results from last 7 days  Lab Units 02/13/18  0705 02/12/18  1233   INR  1 54* 2 80*   PTT seconds  --  31       Results from last 7 days  Lab Units 02/13/18  0812   LACTIC ACID mmol/L 1 1       0  Lab Value Date/Time   TROPONINI <0 02 02/13/2018 0310   TROPONINI <0 02 02/12/2018 1233     Imaging:   No new imaging today   EKG: atrial fibrillation   Micro:  No results found for: Kristin Speaks, Windell Areas, SPUTUMCULTUR  Allergies:    Allergies   Allergen Reactions    Shellfish-Derived Products Anaphylaxis    Penicillins Hives     Medications:   Scheduled Meds:  Current Facility-Administered Medications:  acetaminophen 650 mg Oral Q6H PRN Lo Grey MD   albuterol 2 puff Inhalation Q6H PRN Lo Grey MD   diltiazem 120 mg Oral Daily AGUSTINA Olea   fluticasone 1 puff Inhalation BID Lo Grey MD   HYDROcodone-acetaminophen 1 tablet Oral Q6H PRN Lo Grey MD   metoprolol 5 mg Intravenous Q6H PRN Thong Leon MD   metoprolol tartrate 25 mg Oral Q6H Karmen Talia, BABAK   montelukast 10 mg Oral HS Majestic Hiren Crenshaw MD   prochlorperazine 5 mg Oral Q6H PRN Morelia Hampton PA-C   sodium chloride 500 mL Intravenous Q4H PRN Kei Fontanez MD     Continuous Infusions:   PRN Meds:    acetaminophen 650 mg Q6H PRN   albuterol 2 puff Q6H PRN   HYDROcodone-acetaminophen 1 tablet Q6H PRN   metoprolol 5 mg Q6H PRN   prochlorperazine 5 mg Q6H PRN   sodium chloride 500 mL Q4H PRN     VTE Pharmacologic Prophylaxis: Sequential compression device (Venodyne)  and Reason for no pharmacologic prophylaxis GI bleed  VTE Mechanical Prophylaxis: sequential compression device  Invasive lines and devices: Invasive Devices     Peripheral Intravenous Line            Peripheral IV 02/12/18 Left Antecubital 1 day    Peripheral IV 02/13/18 Right Arm less than 1 day              Counseling / Coordination of Care  Total time spent today 31 minutes  Greater than 50% of total time was spent with the patient and / or family counseling and / or coordination of care  Code Status: Level 1 - Full Code    Portions of the record may have been created with voice recognition software  Occasional wrong word or "sound a like" substitutions may have occurred due to the inherent limitations of voice recognition software  Read the chart carefully and recognize, using context, where substitutions have occurred      AGUSTINA Spence

## 2018-02-14 NOTE — PROGRESS NOTES
GI Progress Note - Blanka Miranda 61 y o  male MRN: [id number]    Unit/Bed#:  Encounter: [id number]    Subjective: Mr Frost feels well today  He had 1 loose BM overnight with a small amount of dark blood  He denies abdominal pain, nausea, or vomiting  He wants to hold off on his xarelto for a few days and he has an appt with his cardiologist at home in the next week  Objective:     Vitals: Blood pressure 130/58, pulse 94, temperature 98 2 °F (36 8 °C), temperature source Oral, resp  rate 18, height 5' 6" (1 676 m), weight 109 kg (240 lb 6 4 oz), SpO2 95 %  ,Body mass index is 38 8 kg/m²        Intake/Output Summary (Last 24 hours) at 02/14/18 1130  Last data filed at 02/14/18 0402   Gross per 24 hour   Intake              550 ml   Output             3550 ml   Net            -3000 ml       Physical Exam:     General Appearance: Alert, oriented x3, no acute distress  Lungs: Clear to auscultation bilaterally, no respiratory distress  Heart: Irregularly irregular, rate controlled, no murmur  Abdomen: Non-distended, soft, BS active, NTTP  Extremities: No cyanosis or edema    Invasive Devices     Peripheral Intravenous Line            Peripheral IV 02/12/18 Left Antecubital 1 day    Peripheral IV 02/13/18 Right Arm less than 1 day                Lab Results:    Results from last 7 days  Lab Units 02/14/18  0447   WBC Thousand/uL 12 66*   HEMOGLOBIN g/dL 8 3*   HEMATOCRIT % 25 8*   PLATELETS Thousands/uL 123*   NEUTROS PCT % 69   LYMPHS PCT % 19   MONOS PCT % 9   EOS PCT % 2       Results from last 7 days  Lab Units 02/14/18  0447   SODIUM mmol/L 143   POTASSIUM mmol/L 3 9   CHLORIDE mmol/L 108   CO2 mmol/L 29   BUN mg/dL 12   CREATININE mg/dL 0 92   CALCIUM mg/dL 8 2*   TOTAL PROTEIN g/dL 4 9*   BILIRUBIN TOTAL mg/dL 0 60   ALK PHOS U/L 42*   ALT U/L 27   AST U/L 17   GLUCOSE RANDOM mg/dL 98       Results from last 7 days  Lab Units 02/13/18  0705   INR  1 54*           Imaging Studies: I have personally reviewed pertinent imaging studies  X-ray Chest 2 Views  Result Date: 2/12/2018  Impression: No active pulmonary disease  Ct Abdomen Pelvis With Contrast  Result Date: 2/12/2018  Impression: 1  Colonic diverticulosis without acute diverticulitis  Scattered air-fluid levels in the colon, nonspecific but may indicate underlying diarrhea  Correlate clinically  2   1 4 cm indeterminant heterogeneous left adrenal nodule with scattered calcifications  Although its imaging features are indeterminate, it does demonstrate some suspicious features such as heterogeneity and calcifications, therefore recommend adrenal mass protocol CT or MRI for further characterization  Recommendation based on institutional consensus and [address] of Radiology 2010;7:754-773 3  Small fat-containing left inguinal hernia  Moderate-sized fat-containing periumbilical hernia  4   Bilateral spondylolysis defects at L5 with grade 1-2 anterolisthesis of L5 on S1  Mild to moderate posterior wedging of L5 may be degenerative        Assessment and Plan:    Acute Blood Loss Anemia  Hematochezia  - S/P Colonoscopy yesterday showing moderately severe diverticulosis in the descending colon, sigmoid, and rectosigmoid with small amounts of old clotted blood; the R colon and TI appeared normal with bile noted indicated the bleeding came from the L colon  - Patient will need a colonoscopy in 6 months to removed the polyp found in the sigmoid colon  - Hb 8 3 this morning, 1 BM since colonoscopy with small amount of old blood expelled  - H/H check at noon; if stable can recheck tomorrow AM  - Advance diet as tolerated  - Okay to resume xarelto per cardiology recommendations      The patient will be seen by Dr Yamilet Arriola

## 2018-02-14 NOTE — PROGRESS NOTES
Progress Note - ICU Transfer to SD/MS tele   Mary Kay Hooper 61 y o  male MRN: [id number]  [address] [facility]   Unit/Bed#:  Encounter: [id number]    Code Status: Level 1 - Full Code  POA:    POLST:      Reason for ICU admission:  AFib with RVR and GI bleed    Active problems:   Principal Problem:    Acute blood loss anemia  Active Problems:    Acute GI bleeding    Atrial fibrillation with RVR ([facility] [address] )    Hypertension    [facility]AN[facility]  Resolved Problems:    * No resolved hospital problems  *      Consultants:   Dr Loren Rodríguez    History of Present Illness: Per Asia Roca, [facility] [place] [facility] consult note:  "Mary Kay Hooper is a 61 y o  male who presented to [facility] ED for evaluation for rectal bleeding with associated lower abdominal cramping  Patient has a past medical history of Xarelto, hypertension, GERD, and chronic back pain  Per documentation patient reported acute onset lower abdominal crampy pain in lower abdomen with associated bloody diarrhea  He described admixture of bright red blood mix throughout stool  He continued to have multiple episodes throughout day prompting ED evaluation  Patient denied history if GI bleed or hemorrhoids  Upon arrival patient was noted to have Hgb 9 0 with AFib with RVR and 120s to 130s  Reported associated lightheadedness and weakness  CT did not reveal localized source of bleeding  Patient received 2 L of crystalloid fluid resuscitation as well as 1 unit of packed red blood cells  He remained hemodynamically stable without additional episodes bright red blood per rectum  He subsequently admitted to the general medicine floor  Over the course the evening he received bowel prep for tentative colonoscopy today  He  remained in AFib with RVR into the 120s  He was placed on IV fluids at 75 mL an hour  Patient received received 1 time dose of digoxin for rapid heart rate in setting of soft systolic blood pressures    Upon evaluation he was found to be in AFib with heart rates in the 120s  Follow   Patient asymptomatic  Denies lightheaded dizziness, chest pain or difficulties breathing  Hemoglobins have been relatively stable with slight downtrend  He will be subsequently transferred to step-down unit for closer hemodynamic monitoring "       Summary of clinical course:   Patient was transferred to the step-down unit for closer monitoring  He underwent a colonoscopy which showed self-limiting diverticular bleed  His hemoglobin has been trended and has been stable  It was found to be 10 1 on the evening of February 13th, approximately 12 hours later was found to be 8 3, and 6 hours after that was 8 9  He has not had any further active bleeding  The patient was on Xarelto for anticoagulation for his atrial fibrillation, that has been held  Per GI it was okay to restart, however the patient and Cardiology would both prefer to wait several days to a week before restarting  Therefore the patient will likely be discharged without the Xarelto and will follow up with his cardiologist in [place] within the week and discuss restarting it  His heart rate has remained stable on oral Cardizem and beta-blockers      Recent or scheduled procedures:  Colonoscopy that showed a self-limiting diverticular bleed    Outstanding/pending diagnostics:  Hemoglobins every 6 hours    Cultures:  Stool panel normal       Mobilization Plan:  Out of bed as able    Nutrition Plan:  Cardiac    Discharge Plan:   Patient should be ready for discharge to home     Initial Physical Therapy Recommendations:  Not applicable  Initial Occupational Therapy Recommendations:  Not applicable  Initial /Plan:  Discharged to home, no needs anticipated    Home medications that are not reordered and reason why:  Xarelto on hold-will be started in a few days to a week after patient is discharged and follows up with his outpatient cardiologist in [place]  Diclofenac held as well secondary to GI bleeding, losartan held as well as patient is now on diltiazem and metoprolol  Specific Diagnosis Plan:    Sepsis:  Not applicable  Heart Failure:  Not applicable  Hyperglycemia:  Not applicable  Spoke with Dr Brenda Parker  regarding transfer  Please call [phone] with any questions or concerns  Portions of the record may have been created with voice recognition software  Occasional wrong word or "sound a like" substitutions may have occurred due to the inherent limitations of voice recognition software  Read the chart carefully and recognize, using context, where substitutions have occurred      Marta Smith

## 2018-02-15 VITALS
BODY MASS INDEX: 38.63 KG/M2 | DIASTOLIC BLOOD PRESSURE: 89 MMHG | TEMPERATURE: 98.1 F | HEART RATE: 100 BPM | WEIGHT: 240.4 LBS | OXYGEN SATURATION: 95 % | SYSTOLIC BLOOD PRESSURE: 134 MMHG | HEIGHT: 66 IN | RESPIRATION RATE: 18 BRPM

## 2018-02-15 PROBLEM — N17.9 AKI (ACUTE KIDNEY INJURY) (HCC): Status: RESOLVED | Noted: 2018-02-12 | Resolved: 2018-02-15

## 2018-02-15 LAB
ANION GAP SERPL CALCULATED.3IONS-SCNC: 4 MMOL/L (ref 4–13)
BASOPHILS # BLD AUTO: 0.04 THOUSANDS/ΜL (ref 0–0.1)
BASOPHILS NFR BLD AUTO: 0 % (ref 0–1)
BUN SERPL-MCNC: 14 MG/DL (ref 5–25)
CALCIUM SERPL-MCNC: 8 MG/DL (ref 8.3–10.1)
CHLORIDE SERPL-SCNC: 106 MMOL/L (ref 100–108)
CO2 SERPL-SCNC: 30 MMOL/L (ref 21–32)
CREAT SERPL-MCNC: 0.95 MG/DL (ref 0.6–1.3)
EOSINOPHIL # BLD AUTO: 0.32 THOUSAND/ΜL (ref 0–0.61)
EOSINOPHIL NFR BLD AUTO: 3 % (ref 0–6)
ERYTHROCYTE [DISTWIDTH] IN BLOOD BY AUTOMATED COUNT: 14.2 % (ref 11.6–15.1)
GFR SERPL CREATININE-BSD FRML MDRD: 88 ML/MIN/1.73SQ M
GLUCOSE SERPL-MCNC: 113 MG/DL (ref 65–140)
HCT VFR BLD AUTO: 21.7 % (ref 36.5–49.3)
HCT VFR BLD AUTO: 22.7 % (ref 36.5–49.3)
HGB BLD-MCNC: 7.1 G/DL (ref 12–17)
HGB BLD-MCNC: 7.5 G/DL (ref 12–17)
LYMPHOCYTES # BLD AUTO: 2.36 THOUSANDS/ΜL (ref 0.6–4.47)
LYMPHOCYTES NFR BLD AUTO: 20 % (ref 14–44)
MCH RBC QN AUTO: 30.5 PG (ref 26.8–34.3)
MCHC RBC AUTO-ENTMCNC: 32.7 G/DL (ref 31.4–37.4)
MCV RBC AUTO: 93 FL (ref 82–98)
MONOCYTES # BLD AUTO: 1.13 THOUSAND/ΜL (ref 0.17–1.22)
MONOCYTES NFR BLD AUTO: 10 % (ref 4–12)
NEUTROPHILS # BLD AUTO: 7.81 THOUSANDS/ΜL (ref 1.85–7.62)
NEUTS SEG NFR BLD AUTO: 66 % (ref 43–75)
NRBC BLD AUTO-RTO: 1 /100 WBCS
PLATELET # BLD AUTO: 161 THOUSANDS/UL (ref 149–390)
PMV BLD AUTO: 10.3 FL (ref 8.9–12.7)
POTASSIUM SERPL-SCNC: 4.1 MMOL/L (ref 3.5–5.3)
RBC # BLD AUTO: 2.33 MILLION/UL (ref 3.88–5.62)
SODIUM SERPL-SCNC: 140 MMOL/L (ref 136–145)
WBC # BLD AUTO: 11.92 THOUSAND/UL (ref 4.31–10.16)

## 2018-02-15 PROCEDURE — 99239 HOSP IP/OBS DSCHRG MGMT >30: CPT | Performed by: INTERNAL MEDICINE

## 2018-02-15 PROCEDURE — 85018 HEMOGLOBIN: CPT | Performed by: PHYSICIAN ASSISTANT

## 2018-02-15 PROCEDURE — 99232 SBSQ HOSP IP/OBS MODERATE 35: CPT | Performed by: PHYSICIAN ASSISTANT

## 2018-02-15 PROCEDURE — 85014 HEMATOCRIT: CPT | Performed by: PHYSICIAN ASSISTANT

## 2018-02-15 PROCEDURE — 80048 BASIC METABOLIC PNL TOTAL CA: CPT | Performed by: NURSE PRACTITIONER

## 2018-02-15 PROCEDURE — 85025 COMPLETE CBC W/AUTO DIFF WBC: CPT | Performed by: NURSE PRACTITIONER

## 2018-02-15 RX ORDER — METOPROLOL TARTRATE 50 MG/1
50 TABLET, FILM COATED ORAL EVERY 12 HOURS SCHEDULED
Qty: 60 TABLET | Refills: 2 | Status: SHIPPED | OUTPATIENT
Start: 2018-02-15

## 2018-02-15 RX ADMIN — METOPROLOL TARTRATE 50 MG: 50 TABLET ORAL at 09:20

## 2018-02-15 RX ADMIN — DILTIAZEM HYDROCHLORIDE 120 MG: 120 CAPSULE, COATED, EXTENDED RELEASE ORAL at 09:19

## 2018-02-15 NOTE — PLAN OF CARE
Problem: DISCHARGE PLANNING - CARE MANAGEMENT  Goal: Discharge to post-acute care or home with appropriate resources  INTERVENTIONS:  - Conduct assessment to determine patient/family and health care team treatment goals, and need for post-acute services based on payer coverage, community resources, and patient preferences, and barriers to discharge  - Address psychosocial, clinical, and financial barriers to discharge as identified in assessment in conjunction with the patient/family and health care team  - Arrange appropriate level of post-acute services according to patient's   needs and preference and payer coverage in collaboration with the physician and health care team  - Communicate with and update the patient/family, physician, and health care team regarding progress on the discharge plan  - Arrange appropriate transportation to post-acute venues   Outcome: Completed Date Met: 02/15/18  CM met with pt at bedside  Pt to be discharged home [facility]) with wife Sylvia Veronica transporting

## 2018-02-15 NOTE — PROGRESS NOTES
GI Progress Note - Vicki Kemp 61 y o  male MRN: [id number]    Unit/Bed#: -01 Encounter: [id number]    Subjective: He feels well this morning  He had a small bowel movement last night with a small amount of very dark blood  He denies abdominal pain, nausea, vomiting  He denies feeling dizzy or lightheaded  Objective:     Vitals: Blood pressure 118/61, pulse (!) 120, temperature 98 6 °F (37 °C), temperature source Oral, resp  rate 20, height 5' 6" (1 676 m), weight 109 kg (240 lb 6 4 oz), SpO2 94 %  ,Body mass index is 38 8 kg/m²  Intake/Output Summary (Last 24 hours) at 02/15/18 1041  Last data filed at 02/15/18 0900   Gross per 24 hour   Intake                0 ml   Output             1270 ml   Net            -1270 ml       Physical Exam:     General Appearance: Alert, oriented x3, no acute distress  Lungs: Clear to auscultation bilaterally, no respiratory distress  Heart: (+) Irregularly irregular, no murmur  Abdomen: Non-distended, soft, BS active, NTTP  Extremities: No cyanosis or edema    Invasive Devices          No matching active lines, drains, or airways          Lab Results:    Results from last 7 days  Lab Units 02/15/18  0516   WBC Thousand/uL 11 92*   HEMOGLOBIN g/dL 7 1*   HEMATOCRIT % 21 7*   PLATELETS Thousands/uL 161   NEUTROS PCT % 66   LYMPHS PCT % 20   MONOS PCT % 10   EOS PCT % 3       Results from last 7 days  Lab Units 02/15/18  0515 02/14/18  0447   SODIUM mmol/L 140 143   POTASSIUM mmol/L 4 1 3 9   CHLORIDE mmol/L 106 108   CO2 mmol/L 30 29   BUN mg/dL 14 12   CREATININE mg/dL 0 95 0 92   CALCIUM mg/dL 8 0* 8 2*   TOTAL PROTEIN g/dL  --  4 9*   BILIRUBIN TOTAL mg/dL  --  0 60   ALK PHOS U/L  --  42*   ALT U/L  --  27   AST U/L  --  17   GLUCOSE RANDOM mg/dL 113 98       Results from last 7 days  Lab Units 02/13/18  0705   INR  1 54*           Imaging Studies: I have personally reviewed pertinent imaging studies        X-ray Chest 2 Views  Result Date: 2/12/2018  Impression: No active pulmonary disease  Ct Abdomen Pelvis With Contrast  Result Date: 2/12/2018  Impression: 1  Colonic diverticulosis without acute diverticulitis  Scattered air-fluid levels in the colon, nonspecific but may indicate underlying diarrhea  Correlate clinically  2   1 4 cm indeterminant heterogeneous left adrenal nodule with scattered calcifications  Although its imaging features are indeterminate, it does demonstrate some suspicious features such as heterogeneity and calcifications, therefore recommend adrenal mass protocol CT or MRI for further characterization  Recommendation based on institutional consensus and [address] of Radiology 2010;7:754-773 3  Small fat-containing left inguinal hernia  Moderate-sized fat-containing periumbilical hernia  4   Bilateral spondylolysis defects at L5 with grade 1-2 anterolisthesis of L5 on S1  Mild to moderate posterior wedging of L5 may be degenerative         Assessment and Plan:     Acute Blood Loss Anemia  Hematochezia  - S/P Colonoscopy yesterday showing moderately severe diverticulosis in the descending colon, sigmoid, and rectosigmoid with small amounts of old clotted blood; the R colon and TI appeared normal with bile noted indicated the bleeding came from the L colon  - Bleeding likely 2/2 to self limited diverticular bleed as there was no active bleeding noted in the colon during colonoscopy  - Patient will need a colonoscopy in 6 months to removed the polyp found in the sigmoid colon  - Hb 7 1 this morning down from 9 0; patient clinically seems stable so suspect this could be an error but would recheck a STAT H/H to confirm  - Pt will likely be discharged regardless as he is trying to go back home to [place]; counseled regarding going to the hospital immediately if he starts bleeding again  - Diet as tolerated  - Agree with holding Xarelto per cardiology until follow up with OP cardiologist      The patient's care will be discussed with Dr Bipin Mallory

## 2018-02-15 NOTE — DISCHARGE SUMMARY
[facility]    Patient Information: Vicki Kemp 61 y o  male MRN: [id number]  Unit/Bed#: -01 Encounter: [id number]    Discharging Physician / Practitioner: Bhavin Van MD  PCP: No primary care provider on file  Admission Date: 2/12/2018  Discharge Date: 02/15/18    Reason for Admission:  Acute blood loss anemia    Discharge Diagnoses:     Principal Problem:    Acute lower GI bleeding  Active Problems:    Acute blood loss anemia    Atrial fibrillation with RVR ([facility] [address] )    Hypertension  Resolved Problems:    [facility]ANO [facility]) [facility] )    Present on Admission:   Acute blood loss anemia   Acute lower GI bleeding   [facility])   Hypertension   (Resolved) CAYETANO (acute kidney injury) ([facility] )    Consultations During Hospital Stay:  · Cardiac  · GI  · Critical care team    Procedures Performed:     · Status post colonoscopy-showing extensive diverticulosis and old blood    Significant Findings:     · As above    Incidental Findings:   · None     Test Results Pending at Discharge (will require follow up): · None     Outpatient Tests Requested:  · None    Complications:  None    Hospital Course:     Vicki Kemp is a 61 y o  male patient who originally presented to the hospital on 2/12/2018 due to bleeding per rectum  He had multiple episodes of bleeding per rectum and had no significant abdominal pain  He was nauseous in the ER and vomited couple of times although no hematemesis  He has no history of GI bleed in the past   Patient on Xarelto with coagulopathy  He was found to be significantly hemodynamically unstable in the ER and admitted to floor for further evaluation and treatment    Overnight, patient received blood transfusion in addition to significant amount of IV fluids/fluid boluses, however, his condition continued to be guarded as his heart rate was going still in 140s to 150s uncontrolled atrial fibrillation even after receiving IV fluid resuscitation and blood  His blood pressure was also on the lower side and received IV digoxin to control his heart rate  After multiple calls/discussion with critical care team, patient was transferred to intensive care unit for close monitoring and for further workup including colonoscopy  His hemoglobin continued to drop off and on  Yesterday evening, his hemoglobin came up to 9, and this morning his hemoglobin is 7 1  He had a small bowel movement last night and there was some old blood  Patient otherwise feels pretty good now  He was dizzy/lightheaded when he came to the hospital   His blood pressure is better  He is still tachycardic with heart rate going up to 120s at times  Discussed with Cardiology  Patient actually is visiting from [place] and wants to leave today with his family who is going back today, otherwise, he would have no way to go back home for days  Clinically as mentioned above, he looks better and he feels better  His NSAIDs which she was taking for pain in his shoulders and Xarelto has been discontinued  As he is on beta-blocker in addition to Cardizem, his ARB also has been discontinued  Advised him to follow up with primary care physician and his cardiologist regarding resuming his Xarelto and also follow up with a gastroenterologist closely  If he develops symptoms again during his travel, he should seek immediate medical attention  Plan of care was discussed in detail with patient and staff  Also discussed with Cardiology  I have discussed with GI service yesterday as well  Condition at Discharge: fair     Discharge Day Visit / Exam:     Subjective:  He feels much better today  No chest pain  He had a small bowel movement last night and had some blood in it which was old  Denies being dizzy or lightheaded this morning    Vitals: Blood Pressure: 118/61 (02/15/18 0919)  Pulse: (!) 120 (02/15/18 0919)  Temperature: 98 6 °F (37 °C) (02/15/18 0700)  Temp Source: Oral (02/15/18 0700)  Respirations: 20 (02/15/18 0700)  Height: 5' 6" (167 6 cm) (02/12/18 2126)  Weight - Scale: 109 kg (240 lb 6 4 oz) (02/12/18 2126)  SpO2: 94 % (02/15/18 0700)  Exam:        Vital signs are reviewed as above  S1 and S2 are audible  Heart rate around 100 irregularly irregular  Conjunctivae and skin are pale  Chest mostly clear to auscultation  Abdomen is obese  It is nontender  Bowel sounds are audible  Awake and alert  Oriented x3              Discharge instructions/Information to patient and family:   See after visit summary for information provided to patient and family  Provisions for Follow-Up Care:  See after visit summary for information related to follow-up care and any pertinent home health orders  Disposition:     Home    For Discharges to [facility] SNF:   · Not Applicable to this Patient - Not Applicable to this Patient    Planned Readmission:  None     Discharge Statement:  I spent 35+ minutes discharging the patient  This time was spent on the day of discharge  I had direct contact with the patient on the day of discharge  Greater than 50% of the total time was spent examining patient, answering all patient questions, arranging and discussing plan of care with patient as well as directly providing post-discharge instructions  Additional time then spent on discharge activities  Discharge Medications:  See after visit summary for reconciled discharge medications provided to patient and family  ** Please Note: Dragon 360 Dictation voice to text software may have been used in the creation of this document   **

## 2018-02-15 NOTE — NURSING NOTE
All discharge instructions reviewed with patient  Verbal and written instructions given  All questions answered  Metoprolol RX given  Verbalized understanding

## 2018-02-15 NOTE — PROGRESS NOTES
General Cardiology   Progress Note   Paresh Countess 61 y o  male MRN: [id number]  Unit/Bed#: -01 Encounter: [id number]        Subjective:   No significant events since the last encounter  HR 120s-130s but has not received medications yet  HR 50s-60s when sleeping overnight  If HR goes down, can be discharged  Denies chest pain, shortness of breath, lightheadedness, palpitations, syncope, leg swelling  Objective:   Vitals:  Vitals:    02/15/18 1100   BP: 134/89   Pulse: 100   Resp: 18   Temp: 98 1 °F (36 7 °C)   SpO2: 95%       Body mass index is 38 8 kg/m²  Systolic (17UBN), EDK:681 , Min:102 , SRH:947     Diastolic (18IMC), JDE:32, Min:58, Max:82      Intake/Output Summary (Last 24 hours) at 02/15/18 0924  Last data filed at 02/15/18 2404   Gross per 24 hour   Intake                0 ml   Output              770 ml   Net             -770 ml     Weight (last 2 days)     None          Telemetry Review: A fib with RVR    PHYSICAL EXAMS:  General:  Patient is not in acute distress, laying in the bed comfortably, awake, alert responding to commands  Head: Normocephalic, Atraumatic  HEENT: White sclera, pink conjunctiva,  PERRLA,pharynx benign  Neck:  Supple, no neck vein distention, carotids+2/+2 no bruits, thyromegaly, adenopathy  Respiratory: clear to P/A  Cardiovascular: +irregularly irregular  +tachycardic  PMI normal, S1-S2 normal, No  Murmurs, thrills, gallops, rubs   GI:  Abdomen soft nontender   No hepatosplenomegaly, adenopathy, ascites,or rebound tenderness  Extremities: No edema, normal pulses, no calf tenderness, no joint deformities, no venous disease   Integument:  No skin rashes or ulceration  Lymphatic:  No cervical or inguinal lymphadenopathy  Neurologic:  Patient is awake alert, responding to command, well-oriented to time and place and person moving all extremities      LABORATORY RESULTS:    Results from last 7 days  Lab Units 02/13/18  0310 02/12/18  1233   TROPONIN I ng/mL <0 02 <0 02 CBC with diff:   Results from last 7 days  Lab Units 02/15/18  0516 02/14/18  1918 02/14/18  1135 02/14/18  0447  02/13/18  0704  02/12/18  1233   WBC Thousand/uL 11 92*  --   --  12 66*  --  18 41*  --  16 81*   HEMOGLOBIN g/dL 7 1* 9 0* 8 9* 8 3*  < > 8 5*  < > 9 0*   HEMATOCRIT % 21 7* 27 3*  --  25 8*  < > 25 0*  < > 27 8*   MCV fL 93  --   --  95  --  90  --  94   PLATELETS Thousands/uL 161  --   --  123*  --  130*  --  175   MCH pg 30 5  --   --  30 5  --  30 7  --  30 4   MCHC g/dL 32 7  --   --  32 2  --  34 0  --  32 4   RDW % 14 2  --   --  14 5  --  14 2  --  13 2   MPV fL 10 3  --   --  10 9  --  11 5  --  11 3   NRBC AUTO /100 WBCs 1  --   --  0  --   --   --  0   < > = values in this interval not displayed      CMP:  Results from last 7 days  Lab Units 02/15/18  0515 02/14/18 0447 02/13/18  0704 02/12/18  1233   SODIUM mmol/L 140 143 140 139   POTASSIUM mmol/L 4 1 3 9 4 4 4 4   CHLORIDE mmol/L 106 108 106 104   CO2 mmol/L 30 29 26 27   ANION GAP mmol/L 4 6 8 8   BUN mg/dL 14 12 27* 36*   CREATININE mg/dL 0 95 0 92 1 04 1 55*   GLUCOSE RANDOM mg/dL 113 98 131 125   CALCIUM mg/dL 8 0* 8 2* 7 9* 8 4   AST U/L  --  17 15 15   ALT U/L  --  27 27 37   ALK PHOS U/L  --  42* 45* 62   TOTAL PROTEIN g/dL  --  4 9* 4 9* 6 0*   BILIRUBIN TOTAL mg/dL  --  0 60 0 70 0 30   EGFR ml/min/1 73sq m 88 91 79 49       BMP:  Results from last 7 days  Lab Units 02/15/18  0515 02/14/18 0447 02/13/18  0704   SODIUM mmol/L 140 143 140   POTASSIUM mmol/L 4 1 3 9 4 4   CHLORIDE mmol/L 106 108 106   CO2 mmol/L 30 29 26   BUN mg/dL 14 12 27*   CREATININE mg/dL 0 95 0 92 1 04   GLUCOSE RANDOM mg/dL 113 98 131   CALCIUM mg/dL 8 0* 8 2* 7 9*                Results from last 7 days  Lab Units 02/14/18  0447   MAGNESIUM mg/dL 2 0               Results from last 7 days  Lab Units 02/13/18  0705 02/12/18  1233   INR  1 54* 2 80*       Lipid Profile:   No results found for: CHOL  No results found for: HDL  No results found for: [address]  No results found for: TRIG    Cardiac testing:   No results found for this or any previous visit  No results found for this or any previous visit  No results found for this or any previous visit  No procedure found  No results found for this or any previous visit  Meds/Allergies   all current active meds have been reviewed and current meds:   Current Facility-Administered Medications   Medication Dose Route Frequency    acetaminophen (TYLENOL) tablet 650 mg  650 mg Oral Q6H PRN    albuterol (PROVENTIL HFA,VENTOLIN HFA) inhaler 2 puff  2 puff Inhalation Q6H PRN    diltiazem (CARDIZEM CD) 24 hr capsule 120 mg  120 mg Oral Daily    fluticasone (FLOVENT HFA) 220 mcg/act inhaler 1 puff  1 puff Inhalation BID    HYDROcodone-acetaminophen (NORCO) 5-325 mg per tablet 1 tablet  1 tablet Oral Q6H PRN    metoprolol (LOPRESSOR) injection 5 mg  5 mg Intravenous Q6H PRN    metoprolol tartrate (LOPRESSOR) tablet 50 mg  50 mg Oral Q12H YARI    montelukast (SINGULAIR) tablet 10 mg  10 mg Oral HS    prochlorperazine (COMPAZINE) tablet 5 mg  5 mg Oral Q6H PRN    sodium chloride 0 9 % bolus 500 mL  500 mL Intravenous Q4H PRN     Prescriptions Prior to Admission   Medication    albuterol (PROVENTIL HFA,VENTOLIN HFA) 90 mcg/act inhaler    diclofenac (VOLTAREN) 75 mg EC tablet    diltiazem (DILACOR XR) 120 MG 24 hr capsule    fluticasone (FLOVENT HFA) 220 mcg/act inhaler    HYDROcodone-acetaminophen (NORCO)  mg per tablet    losartan (COZAAR) 25 mg tablet    mometasone (NASONEX) 50 mcg/act nasal spray    montelukast (SINGULAIR) 10 mg tablet    oseltamivir (TAMIFLU) 75 mg capsule    pantoprazole (PROTONIX) 40 mg tablet    rivaroxaban (XARELTO) 20 mg tablet            Assessment/Plan:  1   Atrial fibrillation with RVR:     -HR elevated today, however has not received medications yet    -HR 50s to 60s when sleeping   -Continue Lopressor at current dose  -Advised patient to resume Xarelto in 1 week given previous bleeding episode  Last recorded hemoglobin 7 5  Managed by hospitalist medicine  Per gastroenterology, patient is clinically stable  -Patient states he will follow up with his cardiologist in [place]      2   Hypertension:   Last recorded /89  Continue present regimen  ** Please Note: Dragon 360 Dictation voice to text software may have been used in the creation of this document   **

## 2018-02-15 NOTE — SOCIAL WORK
CM met with pt at bedside  Pt to be discharged home [facility]) with wife Josue Brycepatt transporting

## 2018-02-15 NOTE — CASE MANAGEMENT
Notification of Discharge  This is a Notification of Discharge from our facility [address]  Please be advised that this patient has been discharge from our facility  Below you will find the admission and discharge date and time including the patients disposition  PRESENTATION DATE: 2/12/2018 12:05 PM  IP ADMISSION DATE: 2/12/18 1924  DISCHARGE DATE: 2/15/2018 11:33 AM  DISPOSITION: Home/Self Care    [address] [facility] in the [facility] by [address] for 2017  Network Utilization Review Department  Phone: [phone]; Fax [phone]  ATTENTION: The Network Utilization Review Department is now centralized for our 7 Facilities  Make a note that we have a new phone and fax numbers for our Department  Please call with any questions or concerns to [phone] and carefully follow the prompts so that you are directed to the right person  All voicemails are confidential  Fax any determinations, approvals, denials, and requests for initial or continue stay review clinical to [phone]  Due to HIGH CALL volume, it would be easier if you could please send faxed requests to expedite your requests and in part, help us provide discharge notifications faster

## 2018-02-15 NOTE — PLAN OF CARE
CARDIOVASCULAR - ADULT     Maintains optimal cardiac output and hemodynamic stability Progressing     Absence of cardiac dysrhythmias or at baseline rhythm Progressing        DISCHARGE PLANNING - CARE MANAGEMENT     Discharge to post-acute care or home with appropriate resources Progressing        HEMATOLOGIC - ADULT     Maintains hematologic stability Progressing        METABOLIC, FLUID AND ELECTROLYTES - ADULT     Electrolytes maintained within normal limits Progressing     Fluid balance maintained Progressing        Potential for Falls     Patient will remain free of falls Progressing        Prexisting or High Potential for Compromised Skin Integrity     Skin integrity is maintained or improved Progressing